# Patient Record
Sex: FEMALE | Race: WHITE | NOT HISPANIC OR LATINO | Employment: OTHER | URBAN - METROPOLITAN AREA
[De-identification: names, ages, dates, MRNs, and addresses within clinical notes are randomized per-mention and may not be internally consistent; named-entity substitution may affect disease eponyms.]

---

## 2017-02-06 ENCOUNTER — OFFICE VISIT (OUTPATIENT)
Dept: FAMILY MEDICINE CLINIC | Facility: CLINIC | Age: 62
End: 2017-02-06

## 2017-02-06 VITALS
OXYGEN SATURATION: 99 % | BODY MASS INDEX: 20.33 KG/M2 | WEIGHT: 122 LBS | HEIGHT: 65 IN | RESPIRATION RATE: 16 BRPM | SYSTOLIC BLOOD PRESSURE: 110 MMHG | HEART RATE: 62 BPM | DIASTOLIC BLOOD PRESSURE: 70 MMHG

## 2017-02-06 DIAGNOSIS — Z00.00 ROUTINE GENERAL MEDICAL EXAMINATION AT A HEALTH CARE FACILITY: ICD-10-CM

## 2017-02-06 DIAGNOSIS — Z12.39 SCREENING FOR BREAST CANCER: ICD-10-CM

## 2017-02-06 DIAGNOSIS — Z11.9 SCREENING EXAMINATION FOR INFECTIOUS DISEASE: ICD-10-CM

## 2017-02-06 DIAGNOSIS — M85.80 OSTEOPENIA: ICD-10-CM

## 2017-02-06 DIAGNOSIS — Z13.220 SCREENING FOR LIPOID DISORDERS: Primary | ICD-10-CM

## 2017-02-06 LAB
ALBUMIN SERPL-MCNC: 4.9 G/DL (ref 3.5–5.2)
ALBUMIN/GLOB SERPL: 2.3 G/DL
ALP SERPL-CCNC: 43 U/L (ref 39–117)
ALT SERPL-CCNC: 15 U/L (ref 1–33)
AST SERPL-CCNC: 21 U/L (ref 1–32)
BASOPHILS # BLD AUTO: 0.04 10*3/MM3 (ref 0–0.2)
BASOPHILS NFR BLD AUTO: 0.9 % (ref 0–1.5)
BILIRUB SERPL-MCNC: 0.6 MG/DL (ref 0.1–1.2)
BUN SERPL-MCNC: 19 MG/DL (ref 8–23)
BUN/CREAT SERPL: 26.4 (ref 7–25)
CALCIUM SERPL-MCNC: 9.5 MG/DL (ref 8.6–10.5)
CHLORIDE SERPL-SCNC: 100 MMOL/L (ref 98–107)
CHOLEST SERPL-MCNC: 194 MG/DL (ref 0–200)
CO2 SERPL-SCNC: 27 MMOL/L (ref 22–29)
CREAT SERPL-MCNC: 0.72 MG/DL (ref 0.57–1)
EOSINOPHIL # BLD AUTO: 0.08 10*3/MM3 (ref 0–0.7)
EOSINOPHIL NFR BLD AUTO: 1.9 % (ref 0.3–6.2)
ERYTHROCYTE [DISTWIDTH] IN BLOOD BY AUTOMATED COUNT: 13.5 % (ref 11.7–13)
GLOBULIN SER CALC-MCNC: 2.1 GM/DL
GLUCOSE SERPL-MCNC: 97 MG/DL (ref 65–99)
HCT VFR BLD AUTO: 45 % (ref 35.6–45.5)
HDLC SERPL-MCNC: 117 MG/DL (ref 40–60)
HGB BLD-MCNC: 14.1 G/DL (ref 11.9–15.5)
IMM GRANULOCYTES # BLD: 0 10*3/MM3 (ref 0–0.03)
IMM GRANULOCYTES NFR BLD: 0 % (ref 0–0.5)
LDLC SERPL CALC-MCNC: 68 MG/DL (ref 0–100)
LDLC/HDLC SERPL: 0.58 {RATIO}
LYMPHOCYTES # BLD AUTO: 1.27 10*3/MM3 (ref 0.9–4.8)
LYMPHOCYTES NFR BLD AUTO: 29.5 % (ref 19.6–45.3)
MCH RBC QN AUTO: 28.8 PG (ref 26.9–32)
MCHC RBC AUTO-ENTMCNC: 31.3 G/DL (ref 32.4–36.3)
MCV RBC AUTO: 91.8 FL (ref 80.5–98.2)
MONOCYTES # BLD AUTO: 0.55 10*3/MM3 (ref 0.2–1.2)
MONOCYTES NFR BLD AUTO: 12.8 % (ref 5–12)
NEUTROPHILS # BLD AUTO: 2.37 10*3/MM3 (ref 1.9–8.1)
NEUTROPHILS NFR BLD AUTO: 54.9 % (ref 42.7–76)
PLATELET # BLD AUTO: 232 10*3/MM3 (ref 140–500)
POTASSIUM SERPL-SCNC: 4.5 MMOL/L (ref 3.5–5.2)
PROT SERPL-MCNC: 7 G/DL (ref 6–8.5)
RBC # BLD AUTO: 4.9 10*6/MM3 (ref 3.9–5.2)
SODIUM SERPL-SCNC: 141 MMOL/L (ref 136–145)
TRIGL SERPL-MCNC: 43 MG/DL (ref 0–150)
TSH SERPL DL<=0.005 MIU/L-ACNC: 1.23 MIU/ML (ref 0.27–4.2)
VLDLC SERPL CALC-MCNC: 8.6 MG/DL (ref 5–40)
WBC # BLD AUTO: 4.31 10*3/MM3 (ref 4.5–10.7)

## 2017-02-06 PROCEDURE — 99396 PREV VISIT EST AGE 40-64: CPT | Performed by: NURSE PRACTITIONER

## 2017-02-06 NOTE — PROGRESS NOTES
"Preventive Exam    History of Present Illness: Jackie is here for check up and review of routine health maintenance. She states she is doing well and has no concerns    REVIEW OF SYSTEMS  Constitutional: Negative.    HENT: Negative.    Eyes: Negative.    Respiratory: Negative.    Cardiovascular: Negative.    Gastrointestinal: Negative.    Endocrine: Negative.    Genitourinary: Negative.    Musculoskeletal: Negative for neck stiffness.   Skin: Negative.    Allergic/Immunologic: Negative.    Neurological: Negative.    Hematological: Negative.    Psychiatric/Behavioral: Negative.    All other systems reviewed and are negative.          PHYSICAL EXAM    Vitals:    02/06/17 0815   BP: 110/70   Pulse: 62   Resp: 16   SpO2: 99%   Weight: 122 lb (55.3 kg)   Height: 65\" (165.1 cm)     GENERAL: alert and oriented, afebrile and vital signs stable  HEENT: oral mucosa moist, PEERLA, EOM, conjunctiva normal  No cervical adenopathy  LUNGS: clear to ascultation bilaterally, no rales, ronchi or wheezing  HEART: RRR S1 S2 without murmers, thrills, rubs or gallops  CHEST WALL: within normal limits, no tenderness  BREAST EXAM: No masses, skin changes, tenderness or discharge noted  ABDOMEN: WNL. Normal BS.  EXTREMITIES: No clubbing, cyanosis or edema noted. Normal Pulses.  SKIN: warm, dry, no rashes noted  NEURO: CN II- XII grossly intact    ASSESSMENT AND PLAN  Problem List Items Addressed This Visit     None      Visit Diagnoses     Screening for lipoid disorders    -  Primary    Relevant Orders    Lipid Panel With LDL / HDL Ratio    Routine general medical examination at a health care facility        Relevant Orders    Comprehensive Metabolic Panel    CBC & AUTO Differential    TSH    Screening examination for infectious disease        Relevant Orders    HCV Antibody Reflex To [facility]    Screening for breast cancer        Relevant Orders    Mammo Screening Bilateral With CAD    Osteopenia        Relevant Orders    DEXA Bone Density Axial "        Routine health maintenance reviewed and discussed with Jackie.    .  Orders Placed This Encounter   Procedures   • Mammo Screening Bilateral With CAD     Scheduling Instructions:      Pt scheduling herself at [facility]     Order Specific Question:   Reason for Exam:     Answer:   Yearly Screening   • DEXA Bone Density Axial     Scheduling Instructions:      Pt will schedule     Order Specific Question:   Reason for Exam:     Answer:   osteopenia   • Lipid Panel With LDL / HDL Ratio   • Comprehensive Metabolic Panel   • TSH   • HCV Antibody Reflex To [facility]OKO     Return for Annual.

## 2017-02-06 NOTE — PATIENT INSTRUCTIONS
Calcium; Vitamin D oral tablets  What is this medicine?  CALCIUM; VITAMIN D (GULSHAN see um; VYE ta min D) is a vitamin supplement. It is used to prevent conditions of low calcium and vitamin D.  This medicine may be used for other purposes; ask your health care provider or pharmacist if you have questions.  What should I tell my health care provider before I take this medicine?  They need to know if you have any of these conditions:  -constipation  -dehydration  -heart disease  -high level of calcium or vitamin D in the blood  -high level of phosphate in the blood  -kidney disease  -kidney stones  -liver disease  -parathyroid disease  -sarcoidosis  -stomach ulcer or obstruction  -an unusual or allergic reaction to calcium, vitamin D, tartrazine dye, other medicines, foods, dyes, or preservatives  -pregnant or trying to get pregnant  -breast-feeding  How should I use this medicine?  Take this medicine by mouth with a glass of water. Follow the directions on the label. Take with food or within 1 hour after a meal. Take your medicine at regular intervals. Do not take your medicine more often than directed.  Talk to your pediatrician regarding the use of this medicine in children. While this medicine may be used in children for selected conditions, precautions do apply.  Overdosage: If you think you have taken too much of this medicine contact a poison control center or emergency room at once.  NOTE: This medicine is only for you. Do not share this medicine with others.  What if I miss a dose?  If you miss a dose, take it as soon as you can. If it is almost time for your next dose, take only that dose. Do not take double or extra doses.  What may interact with this medicine?  Do not take this medicine with any of the following medications:  -ammonium chloride  -methenamine  This medicine may also interact with the following medications:  -antibiotics like ciprofloxacin, gatifloxacin,  tetracycline  -captopril  -delavirdine  -diuretics  -gabapentin  -iron supplements  -medicines for fungal infections like ketoconazole and itraconazole  -medicines for seizures like ethotoin and phenytoin  -mineral oil  -mycophenolate  -other vitamins with calcium, vitamin D, or minerals  -quinidine  -rosuvastatin  -sucralfate  -thyroid medicine  This list may not describe all possible interactions. Give your health care provider a list of all the medicines, herbs, non-prescription drugs, or dietary supplements you use. Also tell them if you smoke, drink alcohol, or use illegal drugs. Some items may interact with your medicine.  What should I watch for while using this medicine?  Taking this medicine is not a substitute for a well-balanced diet and exercise. Talk with your doctor or health care provider and follow a healthy lifestyle.  Do not take this medicine with high-fiber foods, large amounts of alcohol, or drinks containing caffeine. Do not take this medicine within 2 hours of any other medicines.  What side effects may I notice from receiving this medicine?  Side effects that you should report to your doctor or health care professional as soon as possible:  -allergic reactions like skin rash, itching or hives, swelling of the face, lips, or tongue  -confusion  -dry mouth  -high blood pressure  -increased hunger or thirst  -increased urination  -irregular heartbeat  -metallic taste  -muscle or bone pain  -pain when urinating  -seizure  -unusually weak or tired  -weight loss  Side effects that usually do not require medical attention (report to your doctor or health care professional if they continue or are bothersome):  -constipation  -diarrhea  -headache  -loss of appetite  -nausea, vomiting  -stomach upset  This list may not describe all possible side effects. Call your doctor for medical advice about side effects. You may report side effects to FDA at 7-167-FDA-5989.  Where should I keep my medicine?  Keep  out of the reach of children.  Store at room temperature between 15 and 30 degrees C (59 and 86 degrees F). Protect from light. Keep container tightly closed. Throw away any unused medicine after the expiration date.  NOTE: This sheet is a summary. It may not cover all possible information. If you have questions about this medicine, talk to your doctor, pharmacist, or health care provider.     © 2016, [employer]. (2009-04-01 17:56:23)

## 2017-02-07 LAB
HCV AB S/CO SERPL IA: <0.1 S/CO RATIO (ref 0–0.9)
HCV AB SERPL QL IA: NORMAL

## 2017-02-17 ENCOUNTER — TELEPHONE (OUTPATIENT)
Dept: FAMILY MEDICINE CLINIC | Facility: CLINIC | Age: 62
End: 2017-02-17

## 2017-02-17 NOTE — TELEPHONE ENCOUNTER
Please call with DEXA report. Normal bone density in her spine, osteopenia in both hips. Encouraged weight bearing exercise and repeat in 2 years,

## 2017-02-23 ENCOUNTER — TELEPHONE (OUTPATIENT)
Dept: FAMILY MEDICINE CLINIC | Facility: CLINIC | Age: 62
End: 2017-02-23

## 2018-02-08 DIAGNOSIS — Z12.31 VISIT FOR SCREENING MAMMOGRAM: Primary | ICD-10-CM

## 2019-02-21 ENCOUNTER — OFFICE VISIT (OUTPATIENT)
Dept: FAMILY MEDICINE CLINIC | Facility: CLINIC | Age: 64
End: 2019-02-21

## 2019-02-21 VITALS
DIASTOLIC BLOOD PRESSURE: 82 MMHG | HEIGHT: 65 IN | HEART RATE: 75 BPM | WEIGHT: 121 LBS | BODY MASS INDEX: 20.16 KG/M2 | SYSTOLIC BLOOD PRESSURE: 126 MMHG | OXYGEN SATURATION: 99 %

## 2019-02-21 DIAGNOSIS — Z12.39 BREAST CANCER SCREENING: Primary | ICD-10-CM

## 2019-02-21 DIAGNOSIS — Z13.6 SCREENING FOR ISCHEMIC HEART DISEASE: ICD-10-CM

## 2019-02-21 DIAGNOSIS — Z12.9 SCREENING FOR CANCER: ICD-10-CM

## 2019-02-21 DIAGNOSIS — Z12.31 ENCOUNTER FOR SCREENING MAMMOGRAM FOR MALIGNANT NEOPLASM OF BREAST: ICD-10-CM

## 2019-02-21 DIAGNOSIS — Z00.00 MEDICARE ANNUAL WELLNESS VISIT, SUBSEQUENT: Primary | ICD-10-CM

## 2019-02-21 PROCEDURE — G0439 PPPS, SUBSEQ VISIT: HCPCS | Performed by: NURSE PRACTITIONER

## 2019-02-21 RX ORDER — TURMERIC ROOT EXTRACT 500 MG
TABLET ORAL
COMMUNITY

## 2019-02-21 NOTE — PROGRESS NOTES
QUICK REFERENCE INFORMATION:  The ABCs of the Annual Wellness Visit    Initial Medicare Wellness Visit    HEALTH RISK ASSESSMENT    1955    Recent Hospitalizations:  No hospitalization(s) within the last year..        Current Medical Providers:  Patient Care Team:  Ericka Alonzo APRN as PCP - General (Family Medicine)  Pancho Jennings MD as Consulting Physician (Allergy and Immunology)        Smoking Status:  Social History     Tobacco Use   Smoking Status Never Smoker       Alcohol Consumption:  Social History     Substance and Sexual Activity   Alcohol Use Yes    Comment: 5-7/week       Depression Screen:   PHQ-2/PHQ-9 Depression Screening 2/21/2019   Little interest or pleasure in doing things 0   Feeling down, depressed, or hopeless 0   Total Score 0       Health Habits and Functional and Cognitive Screening:  Functional & Cognitive Status 2/21/2019   Do you have difficulty preparing food and eating? No   Do you have difficulty bathing yourself, getting dressed or grooming yourself? No   Do you have difficulty using the toilet? No   Do you have difficulty moving around from place to place? No   Do you have trouble with steps or getting out of a bed or a chair? No   In the past year have you fallen or experienced a near fall? No   Current Diet Well Balanced Diet   Dental Exam Up to date   Eye Exam Up to date   Exercise (times per week) 7 times per week   Current Exercise Activities Include Walking   Do you need help using the phone?  No   Are you deaf or do you have serious difficulty hearing?  No   Do you need help with transportation? No   Do you need help shopping? No   Do you need help preparing meals?  No   Do you need help with housework?  No   Do you need help with laundry? No   Do you need help taking your medications? No   Do you need help managing money? No   Do you ever drive or ride in a car without wearing a seat belt? No   Have you felt unusual stress, anger or loneliness in the last month? No    Who do you live with? Spouse   If you need help, do you have trouble finding someone available to you? No   Have you been bothered in the last four weeks by sexual problems? No   Do you have difficulty concentrating, remembering or making decisions? No           Does the patient have evidence of cognitive impairment? No    Asiprin use counseling: Start ASA 81 mg daily       Recent Lab Results:    Visual Acuity:  No exam data present    Age-appropriate Screening Schedule:  Refer to the list below for future screening recommendations based on patient's age, sex and/or medical conditions. Orders for these recommended tests are listed in the plan section. The patient has been provided with a written plan.    Health Maintenance   Topic Date Due   • ZOSTER VACCINE (2 of 2) 04/19/2013   • DXA SCAN  02/06/2019   • PNEUMOCOCCAL VACCINES (65+ LOW/MEDIUM RISK) (1 of 2 - PCV13) 02/01/2020 (Originally 1/1/2018)   • MAMMOGRAM  03/01/2019   • COLONOSCOPY  09/10/2023   • TDAP/TD VACCINES (2 - Td) 11/09/2025   • INFLUENZA VACCINE  Addressed        Subjective   History of Present Illness    Jackie Deleon is a 64 y.o. female who presents for an Annual Wellness Visit.    The following portions of the patient's history were reviewed and updated as appropriate: allergies, current medications, past family history, past medical history, past social history, past surgical history and problem list.    Outpatient Medications Prior to Visit   Medication Sig Dispense Refill   • aspirin 81 MG EC tablet Take 81 mg by mouth daily.     • Calcium-Vitamin D-Vitamin K (VIACTIV) 500-500-40 MG-UNT-MCG chewable tablet Chew.     • Evening Primrose Oil 1000 MG capsule Take  by mouth.     • glucosamine-chondroitin 500-400 MG capsule capsule Take  by mouth 3 (three) times a day with meals.     • Turmeric 500 MG tablet Take  by mouth. 1,000       No facility-administered medications prior to visit.        Patient Active Problem List   Diagnosis   •  "Osteoporosis       Advance Care Planning:  has an advance directive - a copy HAS NOT been provided    Identification of Risk Factors:  Risk factors include: increased fall risk.    Review of Systems    Compared to one year ago, the patient feels her physical health is better.  Compared to one year ago, the patient feels her mental health is better.    Objective     Physical Exam   Constitutional: She is oriented to person, place, and time. Vital signs are normal. She appears well-developed and well-nourished.   HENT:   Head: Normocephalic and atraumatic.   Right Ear: External ear normal.   Left Ear: External ear normal.   Nose: Nose normal.   Mouth/Throat: Oropharynx is clear and moist.   Eyes: Conjunctivae and EOM are normal. Pupils are equal, round, and reactive to light.   Neck: Normal range of motion. Neck supple.   Cardiovascular: Normal rate, regular rhythm, normal heart sounds and intact distal pulses.   Pulmonary/Chest: Effort normal and breath sounds normal.   Abdominal: Soft. Normal appearance and bowel sounds are normal.   Musculoskeletal: Normal range of motion.   Neurological: She is alert and oriented to person, place, and time. She has normal reflexes.   Skin: Skin is warm and dry.   Psychiatric: She has a normal mood and affect. Her behavior is normal. Judgment and thought content normal.   Nursing note and vitals reviewed.      Vitals:    02/21/19 0854   BP: 126/82   Pulse: 75   SpO2: 99%   Weight: 54.9 kg (121 lb)   Height: 165.1 cm (65\")   PainSc: 0-No pain       Patient's Body mass index is 20.14 kg/m². BMI is within normal parameters. No follow-up required..      Assessment/Plan   Patient Self-Management and Personalized Health Advice  The patient has been provided with information about: fall prevention and preventive services including:   · Nutrition counseling provided.    Visit Diagnoses:    ICD-10-CM ICD-9-CM   1. Medicare annual wellness visit, subsequent Z00.00 V70.0   2. Screening for " ischemic heart disease Z13.6 V81.0       Orders Placed This Encounter   Procedures   • Comprehensive metabolic panel   • Lipid Panel With / Chol / HDL Ratio   • CBC & Differential     Order Specific Question:   Manual Differential     Answer:   No       Outpatient Encounter Medications as of 2/21/2019   Medication Sig Dispense Refill   • aspirin 81 MG EC tablet Take 81 mg by mouth daily.     • Calcium-Vitamin D-Vitamin K (VIACTIV) 500-500-40 MG-UNT-MCG chewable tablet Chew.     • Evening Primrose Oil 1000 MG capsule Take  by mouth.     • glucosamine-chondroitin 500-400 MG capsule capsule Take  by mouth 3 (three) times a day with meals.     • Turmeric 500 MG tablet Take  by mouth. 1,000       No facility-administered encounter medications on file as of 2/21/2019.        Reviewed use of high risk medication in the elderly: yes  Reviewed for potential of harmful drug interactions in the elderly: yes    Follow Up:  Return for Annual.     An After Visit Summary and PPPS with all of these plans were given to the patient.

## 2019-02-22 LAB
ALBUMIN SERPL-MCNC: 4.8 G/DL (ref 3.6–4.8)
ALBUMIN/GLOB SERPL: 2.2 {RATIO} (ref 1.2–2.2)
ALP SERPL-CCNC: 41 IU/L (ref 39–117)
ALT SERPL-CCNC: 13 IU/L (ref 0–32)
AST SERPL-CCNC: 20 IU/L (ref 0–40)
BASOPHILS # BLD AUTO: 0 X10E3/UL (ref 0–0.2)
BASOPHILS NFR BLD AUTO: 1 %
BILIRUB SERPL-MCNC: 0.4 MG/DL (ref 0–1.2)
BUN SERPL-MCNC: 20 MG/DL (ref 8–27)
BUN/CREAT SERPL: 32 (ref 12–28)
CALCIUM SERPL-MCNC: 9.9 MG/DL (ref 8.7–10.3)
CHLORIDE SERPL-SCNC: 103 MMOL/L (ref 96–106)
CHOLEST SERPL-MCNC: 198 MG/DL (ref 100–199)
CHOLEST/HDLC SERPL: 1.9 RATIO (ref 0–4.4)
CO2 SERPL-SCNC: 25 MMOL/L (ref 20–29)
CREAT SERPL-MCNC: 0.62 MG/DL (ref 0.57–1)
EOSINOPHIL # BLD AUTO: 0.1 X10E3/UL (ref 0–0.4)
EOSINOPHIL NFR BLD AUTO: 2 %
ERYTHROCYTE [DISTWIDTH] IN BLOOD BY AUTOMATED COUNT: 13.4 % (ref 12.3–15.4)
GLOBULIN SER CALC-MCNC: 2.2 G/DL (ref 1.5–4.5)
GLUCOSE SERPL-MCNC: 90 MG/DL (ref 65–99)
HCT VFR BLD AUTO: 44.8 % (ref 34–46.6)
HDLC SERPL-MCNC: 105 MG/DL
HGB BLD-MCNC: 14.5 G/DL (ref 11.1–15.9)
IMM GRANULOCYTES # BLD AUTO: 0 X10E3/UL (ref 0–0.1)
IMM GRANULOCYTES NFR BLD AUTO: 0 %
LDLC SERPL CALC-MCNC: 86 MG/DL (ref 0–99)
LYMPHOCYTES # BLD AUTO: 1.3 X10E3/UL (ref 0.7–3.1)
LYMPHOCYTES NFR BLD AUTO: 28 %
MCH RBC QN AUTO: 28.6 PG (ref 26.6–33)
MCHC RBC AUTO-ENTMCNC: 32.4 G/DL (ref 31.5–35.7)
MCV RBC AUTO: 88 FL (ref 79–97)
MONOCYTES # BLD AUTO: 0.3 X10E3/UL (ref 0.1–0.9)
MONOCYTES NFR BLD AUTO: 7 %
NEUTROPHILS # BLD AUTO: 2.8 X10E3/UL (ref 1.4–7)
NEUTROPHILS NFR BLD AUTO: 62 %
PLATELET # BLD AUTO: 250 X10E3/UL (ref 150–379)
POTASSIUM SERPL-SCNC: 4.8 MMOL/L (ref 3.5–5.2)
PROT SERPL-MCNC: 7 G/DL (ref 6–8.5)
RBC # BLD AUTO: 5.07 X10E6/UL (ref 3.77–5.28)
SODIUM SERPL-SCNC: 143 MMOL/L (ref 134–144)
TRIGL SERPL-MCNC: 33 MG/DL (ref 0–149)
VLDLC SERPL CALC-MCNC: 7 MG/DL (ref 5–40)
WBC # BLD AUTO: 4.5 X10E3/UL (ref 3.4–10.8)

## 2020-03-10 ENCOUNTER — OFFICE VISIT (OUTPATIENT)
Dept: FAMILY MEDICINE CLINIC | Facility: CLINIC | Age: 65
End: 2020-03-10

## 2020-03-10 VITALS
HEIGHT: 66 IN | WEIGHT: 122 LBS | OXYGEN SATURATION: 99 % | TEMPERATURE: 97.5 F | RESPIRATION RATE: 16 BRPM | HEART RATE: 153 BPM | BODY MASS INDEX: 19.61 KG/M2 | DIASTOLIC BLOOD PRESSURE: 58 MMHG | SYSTOLIC BLOOD PRESSURE: 90 MMHG

## 2020-03-10 DIAGNOSIS — Z79.899 HIGH RISK MEDICATION USE: ICD-10-CM

## 2020-03-10 DIAGNOSIS — Z13.820 OSTEOPOROSIS SCREENING: ICD-10-CM

## 2020-03-10 DIAGNOSIS — Z13.220 ENCOUNTER FOR LIPID SCREENING FOR CARDIOVASCULAR DISEASE: ICD-10-CM

## 2020-03-10 DIAGNOSIS — Z12.39 ENCOUNTER FOR OTHER SCREENING FOR MALIGNANT NEOPLASM OF BREAST: Primary | ICD-10-CM

## 2020-03-10 DIAGNOSIS — Z78.0 POST-MENOPAUSAL: ICD-10-CM

## 2020-03-10 DIAGNOSIS — Z13.6 ENCOUNTER FOR LIPID SCREENING FOR CARDIOVASCULAR DISEASE: ICD-10-CM

## 2020-03-10 PROCEDURE — G0439 PPPS, SUBSEQ VISIT: HCPCS | Performed by: NURSE PRACTITIONER

## 2020-03-10 NOTE — PROGRESS NOTES
"Preventive Exam    History of Present Illness: Jackie is here for check up and review of routine health maintenance. She states she is doing well and has no concerns    REVIEW OF SYSTEMS  Constitutional: Negative.    HENT: Negative.    Eyes: Negative.    Respiratory: Negative.    Cardiovascular: Negative.    Gastrointestinal: Negative.    Endocrine: Negative.    Genitourinary: Negative.    Musculoskeletal: Negative.  Skin: Negative.    Allergic/Immunologic: Negative.    Neurological: Negative.    Hematological: Negative.    Psychiatric/Behavioral: Negative.    I have reviewed the ROS as documented by the MA. Sheila Cooper MA       PHYSICAL EXAM    Vitals:    03/10/20 1000   BP: 90/58   Pulse: (!) 153   Resp: 16   Temp: 97.5 °F (36.4 °C)   SpO2: 99%   Weight: 55.3 kg (122 lb)   Height: 167.6 cm (66\")     GENERAL: alert and oriented, afebrile and vital signs stable  HEENT: oral mucosa moist, PEERLA, EOM, conjunctiva normal  No cervical adenopathy  LUNGS: clear to ascultation bilaterally, no rales, ronchi or wheezing  HEART: RRR S1 S2 without murmers, thrills, rubs or gallops  CHEST WALL: within normal limits, no tenderness  BREAST EXAM: No masses, skin changes, tenderness or discharge noted  ABDOMEN: WNL. Normal BS.  EXTREMITIES: No clubbing, cyanosis or edema noted. Normal Pulses.  SKIN: warm, dry, no rashes noted  NEURO: CN II- XII grossly intact    ASSESSMENT AND PLAN  Problem List Items Addressed This Visit     None        Routine health maintenance reviewed and discussed with Jackie.      No follow-ups on file. Answers for HPI/ROS submitted by the patient on 3/7/2020   What is the primary reason for your visit?: Physical    "

## 2020-03-10 NOTE — PROGRESS NOTES
The ABCs of the Annual Wellness Visit  Subsequent Medicare Wellness Visit    No chief complaint on file.      Subjective   History of Present Illness:  Jackie Deleon is a 65 y.o. female who presents for a Subsequent Medicare Wellness Visit.    HEALTH RISK ASSESSMENT    Recent Hospitalizations:  No hospitalization(s) within the last year.    Current Medical Providers:  Patient Care Team:  Lesley Moncada APRN as PCP - General (Family Medicine)  Pancho Jennings MD as Consulting Physician (Allergy and Immunology)    Smoking Status:  Social History     Tobacco Use   Smoking Status Never Smoker       Alcohol Consumption:  Social History     Substance and Sexual Activity   Alcohol Use Yes    Comment: 5-7/week       Depression Screen:   PHQ-2/PHQ-9 Depression Screening 2/21/2019   Little interest or pleasure in doing things 0   Feeling down, depressed, or hopeless 0   Total Score 0       Fall Risk Screen:  STEADI Fall Risk Assessment has not been completed.    Health Habits and Functional and Cognitive Screening:  Functional & Cognitive Status 2/21/2019   Do you have difficulty preparing food and eating? No   Do you have difficulty bathing yourself, getting dressed or grooming yourself? No   Do you have difficulty using the toilet? No   Do you have difficulty moving around from place to place? No   Do you have trouble with steps or getting out of a bed or a chair? No   Current Diet Well Balanced Diet   Dental Exam Up to date   Eye Exam Up to date   Exercise (times per week) 7 times per week   Current Exercise Activities Include Walking   Do you need help using the phone?  No   Are you deaf or do you have serious difficulty hearing?  No   Do you need help with transportation? No   Do you need help shopping? No   Do you need help preparing meals?  No   Do you need help with housework?  No   Do you need help with laundry? No   Do you need help taking your medications? No   Do you need help managing money? No   Do you ever  drive or ride in a car without wearing a seat belt? No   Have you felt unusual stress, anger or loneliness in the last month? No   Who do you live with? Spouse   If you need help, do you have trouble finding someone available to you? No   Have you been bothered in the last four weeks by sexual problems? No   Do you have difficulty concentrating, remembering or making decisions? No         Does the patient have evidence of cognitive impairment? No    Asprin use counseling:Does not need ASA but is currently taking (advised patient that ASA is not indicated and patient chooses to stop it)    Age-appropriate Screening Schedule:  Refer to the list below for future screening recommendations based on patient's age, sex and/or medical conditions. Orders for these recommended tests are listed in the plan section. The patient has been provided with a written plan.    Health Maintenance   Topic Date Due   • DXA SCAN  02/06/2019   • MAMMOGRAM  03/01/2019   • INFLUENZA VACCINE  08/01/2019   • COLONOSCOPY  09/10/2023   • TDAP/TD VACCINES (2 - Td) 11/09/2025   • ZOSTER VACCINE  Completed          The following portions of the patient's history were reviewed and updated as appropriate: allergies, current medications, past family history, past medical history, past social history, past surgical history and problem list.    Outpatient Medications Prior to Visit   Medication Sig Dispense Refill   • aspirin 81 MG EC tablet Take 81 mg by mouth daily.     • Calcium-Vitamin D-Vitamin K (VIACTIV) 500-500-40 MG-UNT-MCG chewable tablet Chew.     • Evening Primrose Oil 1000 MG capsule Take  by mouth.     • glucosamine-chondroitin 500-400 MG capsule capsule Take  by mouth 3 (three) times a day with meals.     • Turmeric 500 MG tablet Take  by mouth. 1,000       No facility-administered medications prior to visit.        Patient Active Problem List   Diagnosis   • Osteoporosis       Advanced Care Planning:  ACP discussion was held with the  "patient during this visit.    Review of Systems   Constitutional: Negative for activity change, appetite change and fever.   HENT: Negative for ear pain.    Respiratory: Negative for cough.    Neurological: Negative for dizziness and headaches.       Compared to one year ago, the patient feels her physical health is the same.  Compared to one year ago, the patient feels her mental health is the same.    Reviewed chart for potential of high risk medication in the elderly: not applicable  Reviewed chart for potential of harmful drug interactions in the elderly:yes    Objective         Vitals:    03/10/20 1000   BP: 90/58   Pulse: (!) 153   Resp: 16   Temp: 97.5 °F (36.4 °C)   SpO2: 99%   Weight: 55.3 kg (122 lb)   Height: 167.6 cm (66\")       Body mass index is 19.69 kg/m².  Discussed the patient's BMI with her. The BMI is in the acceptable range.    Physical Exam   Constitutional: She appears well-developed and well-nourished. No distress.   HENT:   Head: Normocephalic and atraumatic.   Right Ear: External ear normal.   Left Ear: External ear normal.   Eyes: EOM are normal.   Neck: Neck supple. No thyromegaly present.   Cardiovascular: Normal rate, regular rhythm and normal heart sounds.   Pulmonary/Chest: Effort normal and breath sounds normal.   Musculoskeletal: Normal range of motion.   Neurological: She is alert.   Skin: Skin is warm.   Nursing note and vitals reviewed.            Assessment/Plan   Medicare Risks and Personalized Health Plan  CMS Preventative Services Quick Reference  Breast Cancer/Mammogram Screening    The above risks/problems have been discussed with the patient.  Pertinent information has been shared with the patient in the After Visit Summary.  Follow up plans and orders are seen below in the Assessment/Plan Section.    There are no diagnoses linked to this encounter.  Follow Up:  No follow-ups on file.     An After Visit Summary and PPPS were given to the patient.       Preventative " counseling for diet and exercise with patient at visit.  Pt reports that they wear their seatbelt regularly.         Answers for HPI/ROS submitted by the patient on 3/7/2020   What is the primary reason for your visit?: Physical

## 2020-03-11 LAB
ALBUMIN SERPL-MCNC: 4.6 G/DL (ref 3.5–5.2)
ALBUMIN/GLOB SERPL: 1.9 G/DL
ALP SERPL-CCNC: 45 U/L (ref 39–117)
ALT SERPL-CCNC: 14 U/L (ref 1–33)
AST SERPL-CCNC: 19 U/L (ref 1–32)
BILIRUB SERPL-MCNC: 0.7 MG/DL (ref 0.2–1.2)
BUN SERPL-MCNC: 13 MG/DL (ref 8–23)
BUN/CREAT SERPL: 18.1 (ref 7–25)
CALCIUM SERPL-MCNC: 9.5 MG/DL (ref 8.6–10.5)
CHLORIDE SERPL-SCNC: 100 MMOL/L (ref 98–107)
CHOLEST SERPL-MCNC: 172 MG/DL (ref 0–200)
CO2 SERPL-SCNC: 26.9 MMOL/L (ref 22–29)
CREAT SERPL-MCNC: 0.72 MG/DL (ref 0.57–1)
GLOBULIN SER CALC-MCNC: 2.4 GM/DL
GLUCOSE SERPL-MCNC: 87 MG/DL (ref 65–99)
HDLC SERPL-MCNC: 89 MG/DL (ref 40–60)
LDLC SERPL CALC-MCNC: 74 MG/DL (ref 0–100)
LDLC/HDLC SERPL: 0.84 {RATIO}
POTASSIUM SERPL-SCNC: 4.5 MMOL/L (ref 3.5–5.2)
PROT SERPL-MCNC: 7 G/DL (ref 6–8.5)
SODIUM SERPL-SCNC: 139 MMOL/L (ref 136–145)
TRIGL SERPL-MCNC: 43 MG/DL (ref 0–150)
VLDLC SERPL CALC-MCNC: 8.6 MG/DL

## 2020-03-13 ENCOUNTER — TELEPHONE (OUTPATIENT)
Dept: FAMILY MEDICINE CLINIC | Facility: CLINIC | Age: 65
End: 2020-03-13

## 2020-03-13 NOTE — TELEPHONE ENCOUNTER
Juana with central scheduling received an order for the patient's Bone Density Scan, she would like to know the last time the patient had a Bone Density Scan      Please call back to advise [phone]

## 2020-04-07 ENCOUNTER — TELEMEDICINE (OUTPATIENT)
Dept: FAMILY MEDICINE CLINIC | Facility: CLINIC | Age: 65
End: 2020-04-07

## 2020-04-07 DIAGNOSIS — F41.9 ANXIETY: Primary | ICD-10-CM

## 2020-04-07 PROCEDURE — 99213 OFFICE O/P EST LOW 20 MIN: CPT | Performed by: NURSE PRACTITIONER

## 2020-04-07 RX ORDER — SERTRALINE HYDROCHLORIDE 25 MG/1
25 TABLET, FILM COATED ORAL DAILY
Qty: 60 TABLET | Refills: 1 | Status: SHIPPED | OUTPATIENT
Start: 2020-04-07 | End: 2022-04-01

## 2020-04-08 NOTE — PATIENT INSTRUCTIONS
Generalized Anxiety Disorder, Adult  Generalized anxiety disorder (ALEJANDRO) is a mental health disorder. People with this condition constantly worry about everyday events. Unlike normal anxiety, worry related to ALEJANDRO is not triggered by a specific event. These worries also do not fade or get better with time. ALEJANDRO interferes with life functions, including relationships, work, and school.  ALEJANDRO can vary from mild to severe. People with severe ALEJANDRO can have intense waves of anxiety with physical symptoms (panic attacks).  What are the causes?  The exact cause of ALEJANDRO is not known.  What increases the risk?  This condition is more likely to develop in:  · Women.  · People who have a family history of anxiety disorders.  · People who are very shy.  · People who experience very stressful life events, such as the death of a loved one.  · People who have a very stressful family environment.  What are the signs or symptoms?  People with ALEJANDRO often worry excessively about many things in their lives, such as their health and family. They may also be overly concerned about:  · Doing well at work.  · Being on time.  · Natural disasters.  · Friendships.  Physical symptoms of ALEJANDRO include:  · Fatigue.  · Muscle tension or having muscle twitches.  · Trembling or feeling shaky.  · Being easily startled.  · Feeling like your heart is pounding or racing.  · Feeling out of breath or like you cannot take a deep breath.  · Having trouble falling asleep or staying asleep.  · Sweating.  · Nausea, diarrhea, or irritable bowel syndrome (IBS).  · Headaches.  · Trouble concentrating or remembering facts.  · Restlessness.  · Irritability.  How is this diagnosed?  Your health care provider can diagnose ALEJANDRO based on your symptoms and medical history. You will also have a physical exam. The health care provider will ask specific questions about your symptoms, including how severe they are, when they started, and if they come and go. Your health care  provider may ask you about your use of alcohol or drugs, including prescription medicines. Your health care provider may refer you to a mental health specialist for further evaluation.  Your health care provider will do a thorough examination and may perform additional tests to rule out other possible causes of your symptoms.  To be diagnosed with ALEJANDRO, a person must have anxiety that:  · Is out of his or her control.  · Affects several different aspects of his or her life, such as work and relationships.  · Causes distress that makes him or her unable to take part in normal activities.  · Includes at least three physical symptoms of ALEJANDRO, such as restlessness, fatigue, trouble concentrating, irritability, muscle tension, or sleep problems.  Before your health care provider can confirm a diagnosis of ALEJANDRO, these symptoms must be present more days than they are not, and they must last for six months or longer.  How is this treated?  The following therapies are usually used to treat ALEJANDRO:  · Medicine. Antidepressant medicine is usually prescribed for long-term daily control. Antianxiety medicines may be added in severe cases, especially when panic attacks occur.  · Talk therapy (psychotherapy). Certain types of talk therapy can be helpful in treating ALEJANDRO by providing support, education, and guidance. Options include:  ? Cognitive behavioral therapy (CBT). People learn coping skills and techniques to ease their anxiety. They learn to identify unrealistic or negative thoughts and behaviors and to replace them with positive ones.  ? Acceptance and commitment therapy (ACT). This treatment teaches people how to be mindful as a way to cope with unwanted thoughts and feelings.  ? Biofeedback. This process trains you to manage your body's response (physiological response) through breathing techniques and relaxation methods. You will work with a therapist while machines are used to monitor your physical symptoms.  · Stress  management techniques. These include yoga, meditation, and exercise.  A mental health specialist can help determine which treatment is best for you. Some people see improvement with one type of therapy. However, other people require a combination of therapies.  Follow these instructions at home:  · Take over-the-counter and prescription medicines only as told by your health care provider.  · Try to maintain a normal routine.  · Try to anticipate stressful situations and allow extra time to manage them.  · Practice any stress management or self-calming techniques as taught by your health care provider.  · Do not punish yourself for setbacks or for not making progress.  · Try to recognize your accomplishments, even if they are small.  · Keep all follow-up visits as told by your health care provider. This is important.  Contact a health care provider if:  · Your symptoms do not get better.  · Your symptoms get worse.  · You have signs of depression, such as:  ? A persistently sad, cranky, or irritable mood.  ? Loss of enjoyment in activities that used to bring you sabine.  ? Change in weight or eating.  ? Changes in sleeping habits.  ? Avoiding friends or family members.  ? Loss of energy for normal tasks.  ? Feelings of guilt or worthlessness.  Get help right away if:  · You have serious thoughts about hurting yourself or others.  If you ever feel like you may hurt yourself or others, or have thoughts about taking your own life, get help right away. You can go to your nearest emergency department or call:  · Your local emergency services (911 in the [place]  · A suicide crisis helpline, such as the National Suicide Prevention Lifeline at 1-227.696.5853. This is open 24 hours a day.  Summary  · Generalized anxiety disorder (ALEJANDRO) is a mental health disorder that involves worry that is not triggered by a specific event.  · People with ALEJANDRO often worry excessively about many things in their lives, such as their health and  family.  · ALEJANDRO may cause physical symptoms such as restlessness, trouble concentrating, sleep problems, frequent sweating, nausea, diarrhea, headaches, and trembling or muscle twitching.  · A mental health specialist can help determine which treatment is best for you. Some people see improvement with one type of therapy. However, other people require a combination of therapies.  This information is not intended to replace advice given to you by your health care provider. Make sure you discuss any questions you have with your health care provider.  Document Released: 04/14/2014 Document Revised: 01/07/2020 Document Reviewed: 11/07/2017  [employer] Patient Education © 2020 [employer]

## 2020-04-08 NOTE — PROGRESS NOTES
Subjective anxiety  Jackie Deleon is a 65 y.o. female.     History of Present Illness   Pt is very healthy and not on any prescription medication. She is an avid exerciser but since the Covid 19 restrictions have been put into place she is having some notable anxiety. It is affecting her sleep as well. No SI or HI  The following portions of the patient's history were reviewed and updated as appropriate: allergies, current medications, past family history, past medical history, past social history, past surgical history and problem list.    Review of Systems   Constitutional: Positive for activity change. Negative for fatigue and unexpected weight gain.   Respiratory: Negative for cough and shortness of breath.    Neurological: Negative for dizziness, light-headedness and confusion.   Psychiatric/Behavioral: The patient is nervous/anxious.        Objective   Physical Exam   Constitutional: She is oriented to person, place, and time. She appears well-developed and well-nourished.   HENT:   Head: Normocephalic and atraumatic.   Eyes: Pupils are equal, round, and reactive to light. EOM are normal.   Pulmonary/Chest: Effort normal.   Musculoskeletal: Normal range of motion.   Neurological: She is alert and oriented to person, place, and time.   Skin: Skin is warm and dry.   Psychiatric: She has a normal mood and affect.   Appears anxious as she talks and is tearful at time during exam.   Nursing note and vitals reviewed.        Assessment/Plan   Jackie was seen today for anxiety.    Diagnoses and all orders for this visit:    Anxiety    Other orders  -     sertraline (Zoloft) 25 MG tablet; Take 1 tablet by mouth Daily.      Will check back with the office in a month for follow up.  Covid 19 medical advise given to stay healthy.

## 2020-11-05 DIAGNOSIS — Z78.0 POST-MENOPAUSAL: ICD-10-CM

## 2021-02-05 ENCOUNTER — IMMUNIZATION (OUTPATIENT)
Dept: VACCINE CLINIC | Facility: HOSPITAL | Age: 66
End: 2021-02-05

## 2021-02-05 PROCEDURE — 0001A: CPT | Performed by: INTERNAL MEDICINE

## 2021-02-05 PROCEDURE — 91300 HC SARSCOV02 VAC 30MCG/0.3ML IM: CPT | Performed by: INTERNAL MEDICINE

## 2021-02-26 ENCOUNTER — IMMUNIZATION (OUTPATIENT)
Dept: VACCINE CLINIC | Facility: HOSPITAL | Age: 66
End: 2021-02-26

## 2021-02-26 ENCOUNTER — APPOINTMENT (OUTPATIENT)
Dept: VACCINE CLINIC | Facility: HOSPITAL | Age: 66
End: 2021-02-26

## 2021-02-26 PROCEDURE — 0002A: CPT | Performed by: INTERNAL MEDICINE

## 2021-02-26 PROCEDURE — 91300 HC SARSCOV02 VAC 30MCG/0.3ML IM: CPT | Performed by: INTERNAL MEDICINE

## 2021-06-22 ENCOUNTER — OFFICE VISIT (OUTPATIENT)
Dept: FAMILY MEDICINE CLINIC | Facility: CLINIC | Age: 66
End: 2021-06-22

## 2021-06-22 VITALS
DIASTOLIC BLOOD PRESSURE: 78 MMHG | WEIGHT: 121.5 LBS | HEIGHT: 66 IN | BODY MASS INDEX: 19.53 KG/M2 | SYSTOLIC BLOOD PRESSURE: 120 MMHG | RESPIRATION RATE: 14 BRPM | HEART RATE: 69 BPM | OXYGEN SATURATION: 98 %

## 2021-06-22 DIAGNOSIS — D64.9 LOW HEMOGLOBIN: ICD-10-CM

## 2021-06-22 DIAGNOSIS — Z13.6 ENCOUNTER FOR LIPID SCREENING FOR CARDIOVASCULAR DISEASE: ICD-10-CM

## 2021-06-22 DIAGNOSIS — Z13.220 ENCOUNTER FOR LIPID SCREENING FOR CARDIOVASCULAR DISEASE: ICD-10-CM

## 2021-06-22 DIAGNOSIS — Z12.31 ENCOUNTER FOR SCREENING MAMMOGRAM FOR MALIGNANT NEOPLASM OF BREAST: ICD-10-CM

## 2021-06-22 DIAGNOSIS — Z00.00 MEDICARE ANNUAL WELLNESS VISIT, SUBSEQUENT: Primary | ICD-10-CM

## 2021-06-22 PROCEDURE — G0439 PPPS, SUBSEQ VISIT: HCPCS | Performed by: NURSE PRACTITIONER

## 2021-06-22 PROCEDURE — 1160F RVW MEDS BY RX/DR IN RCRD: CPT | Performed by: NURSE PRACTITIONER

## 2021-06-22 PROCEDURE — 1170F FXNL STATUS ASSESSED: CPT | Performed by: NURSE PRACTITIONER

## 2021-06-22 RX ORDER — AMOXICILLIN 250 MG
CAPSULE ORAL
COMMUNITY

## 2021-06-22 NOTE — PROGRESS NOTES
Medicare Subsequent Wellness Visit  Subjective   History of Present Illness    Jackie Deleon is a 66 y.o. female who presents for an Medicare Wellness Visit. In addition, we addressed the following health issues:  Was placed on zoloft for some anxiety but only took it as needed which did not help.She is doing better has no Hi or SI.    PMH, PSH, SocHx, FamHx, Allergies, and Medications: Reviewed and updated in the history section of chart.  Family History   Problem Relation Age of Onset   • Hypertension Mother    • Multiple myeloma Mother    • Hypertension Father    • Prostate cancer Father    • Breast cancer Paternal Aunt    • Heart disease Maternal Grandfather    • Stroke Maternal Grandfather        Social History     Social History Narrative   • Not on file       No Known Allergies    Outpatient Medications Prior to Visit   Medication Sig Dispense Refill   • aspirin 81 MG EC tablet Take 81 mg by mouth daily.     • Bioflavonoid Products (Sera-C) 500-550 MG tablet      • Calcium-Vitamin D-Vitamin K (VIACTIV) 500-500-40 MG-UNT-MCG chewable tablet Chew.     • Cobalamin Combinations (B-12) 100-5000 MCG sublingual tablet      • glucosamine-chondroitin 500-400 MG capsule capsule Take  by mouth 3 (three) times a day with meals.     • Turmeric 500 MG tablet Take  by mouth. 1,000     • sertraline (Zoloft) 25 MG tablet Take 1 tablet by mouth Daily. 60 tablet 1   • Evening Primrose Oil 1000 MG capsule Take  by mouth.       No facility-administered medications prior to visit.        Patient Active Problem List   Diagnosis   • Osteoporosis         Patient Care Team:  Lesley Moncada APRN as PCP - General (Family Medicine)  Pancho Jennings MD as Consulting Physician (Allergy and Immunology)  Health Habits:  Current Diet: Well balanced diet  Tobacco use.  Pack years:  Dental Exam.   Eye Exam.   Exercise:  Current exercise activities include:    Recent Hospitalizations:  none    Age-appropriate Screening Schedule:  Refer to  the list below for future screening recommendations based on patient's age. Orders for these recommended tests are listed in the plan section. The patient has been provided with a written plan.      Health Maintenance   Topic Date Due   • PAP SMEAR  11/09/2018   • MAMMOGRAM  03/24/2021   • Pneumococcal Vaccine 65+ (1 of 1 - PPSV23) 06/22/2022 (Originally 1/1/2018)   • INFLUENZA VACCINE  08/01/2021   • DXA SCAN  03/24/2022   • ANNUAL WELLNESS VISIT  06/22/2022   • COLORECTAL CANCER SCREENING  09/10/2023   • TDAP/TD VACCINES (2 - Td or Tdap) 11/09/2025   • HEPATITIS C SCREENING  Completed   • COVID-19 Vaccine  Completed   • ZOSTER VACCINE  Completed       Depression Screen:   PHQ-2/PHQ-9 Depression Screening 6/22/2021   Little interest or pleasure in doing things 0   Feeling down, depressed, or hopeless 0   Total Score 0       Functional and Cognitive Screening:  Functional & Cognitive Status 6/22/2021   Do you have difficulty preparing food and eating? No   Do you have difficulty bathing yourself, getting dressed or grooming yourself? No   Do you have difficulty using the toilet? No   Do you have difficulty moving around from place to place? No   Do you have trouble with steps or getting out of a bed or a chair? No   Current Diet Well Balanced Diet   Dental Exam Up to date   Eye Exam Up to date   Exercise (times per week) 6 times per week   Current Exercises Include Walking   Current Exercise Activities Include -   Do you need help using the phone?  No   Are you deaf or do you have serious difficulty hearing?  No   Do you need help with transportation? No   Do you need help shopping? No   Do you need help preparing meals?  No   Do you need help with housework?  No   Do you need help with laundry? No   Do you need help taking your medications? No   Do you need help managing money? No   Do you ever drive or ride in a car without wearing a seat belt? No   Have you felt unusual stress, anger or loneliness in the last  "month? Yes   Who do you live with? Spouse   If you need help, do you have trouble finding someone available to you? No   Have you been bothered in the last four weeks by sexual problems? No   Do you have difficulty concentrating, remembering or making decisions? No     Does the patient have evidence of cognitive impairment?   No  Compared to one year ago, the patient feels their physical health and mental health are the same.   Mental health is better.      Review of Systems    Objective     Vitals:    06/22/21 0801   BP: 120/78   Pulse: 69   Resp: 14   SpO2: 98%   Weight: 55.1 kg (121 lb 8 oz)   Height: 167.6 cm (66\")       Body mass index is 19.61 kg/m².    PHYSICAL EXAM  Vitals reviewed and on chart.  HEENT: PERRLA, EOMI. Oral mucosa moist,   No LAD.  CV: RRR, no murmurs, rubs, clicks or gallops  LUNGS: CTA bilaterally  EXT: No edema, FROM in bilateral upper and lower ext  NEURO: CN II - XII grossly intact  PSYCH: good mood, positive affect, alert and engaged. No thoughts of self harm  expressed.    ASSESSMENT AND PLAN  Pap smear :Not needed  Mammogram:Needs a mammogram  Colon cancer screening :Is due next year  Lung cancer screening :Never smoked  Bone Density : 2 years ago has osteopenia      Problem List Items Addressed This Visit     None          Orders:  No orders of the defined types were placed in this encounter.      Follow Up:  No follow-ups on file.     ADVANCED DIRECTIVES:   ACP discussion was held with the patient during this visit. Patient has an advance directive in EMR which is still valid.     An After Visit Summary and PPPS with all of these plans were given to the patient.    Preventative counseling for diet and exercise with patient at visit.  Pt reports that they wear their seatbelt regularly.            "

## 2021-06-23 LAB
ALBUMIN SERPL-MCNC: 4.7 G/DL (ref 3.5–5.2)
ALBUMIN/GLOB SERPL: 2.6 G/DL
ALP SERPL-CCNC: 45 U/L (ref 39–117)
ALT SERPL-CCNC: 13 U/L (ref 1–33)
AST SERPL-CCNC: 19 U/L (ref 1–32)
BILIRUB SERPL-MCNC: 0.4 MG/DL (ref 0–1.2)
BUN SERPL-MCNC: 15 MG/DL (ref 8–23)
BUN/CREAT SERPL: 25.9 (ref 7–25)
CALCIUM SERPL-MCNC: 9.5 MG/DL (ref 8.6–10.5)
CHLORIDE SERPL-SCNC: 106 MMOL/L (ref 98–107)
CHOLEST SERPL-MCNC: 166 MG/DL (ref 0–200)
CO2 SERPL-SCNC: 26.6 MMOL/L (ref 22–29)
CREAT SERPL-MCNC: 0.58 MG/DL (ref 0.57–1)
ERYTHROCYTE [DISTWIDTH] IN BLOOD BY AUTOMATED COUNT: 15 % (ref 12.3–15.4)
GLOBULIN SER CALC-MCNC: 1.8 GM/DL
GLUCOSE SERPL-MCNC: 90 MG/DL (ref 65–99)
HCT VFR BLD AUTO: 33.2 % (ref 34–46.6)
HDLC SERPL-MCNC: 104 MG/DL (ref 40–60)
HGB BLD-MCNC: 10.1 G/DL (ref 12–15.9)
LDLC SERPL CALC-MCNC: 55 MG/DL (ref 0–100)
LDLC/HDLC SERPL: 0.55 {RATIO}
MCH RBC QN AUTO: 23.5 PG (ref 26.6–33)
MCHC RBC AUTO-ENTMCNC: 30.4 G/DL (ref 31.5–35.7)
MCV RBC AUTO: 77.4 FL (ref 79–97)
PLATELET # BLD AUTO: 264 10*3/MM3 (ref 140–450)
POTASSIUM SERPL-SCNC: 4.5 MMOL/L (ref 3.5–5.2)
PROT SERPL-MCNC: 6.5 G/DL (ref 6–8.5)
RBC # BLD AUTO: 4.29 10*6/MM3 (ref 3.77–5.28)
SODIUM SERPL-SCNC: 140 MMOL/L (ref 136–145)
TRIGL SERPL-MCNC: 25 MG/DL (ref 0–150)
VLDLC SERPL CALC-MCNC: 7 MG/DL (ref 5–40)
WBC # BLD AUTO: 3.5 10*3/MM3 (ref 3.4–10.8)

## 2021-06-24 DIAGNOSIS — E61.1 LOW IRON: Primary | ICD-10-CM

## 2021-07-16 DIAGNOSIS — D64.9 LOW HEMOGLOBIN: ICD-10-CM

## 2021-07-16 DIAGNOSIS — E61.1 LOW IRON: Primary | ICD-10-CM

## 2021-08-11 RX ORDER — LANOLIN ALCOHOL/MO/W.PET/CERES
325 CREAM (GRAM) TOPICAL
Qty: 90 TABLET | Refills: 0 | Status: SHIPPED | OUTPATIENT
Start: 2021-08-11 | End: 2021-11-03

## 2021-11-03 RX ORDER — LANOLIN ALCOHOL/MO/W.PET/CERES
CREAM (GRAM) TOPICAL
Qty: 90 TABLET | Refills: 0 | Status: SHIPPED | OUTPATIENT
Start: 2021-11-03 | End: 2022-04-01

## 2022-03-16 ENCOUNTER — OFFICE VISIT (OUTPATIENT)
Dept: CARDIOLOGY | Facility: CLINIC | Age: 67
End: 2022-03-16

## 2022-03-16 VITALS
HEIGHT: 66 IN | BODY MASS INDEX: 19.89 KG/M2 | OXYGEN SATURATION: 99 % | SYSTOLIC BLOOD PRESSURE: 132 MMHG | WEIGHT: 123.8 LBS | HEART RATE: 48 BPM | DIASTOLIC BLOOD PRESSURE: 90 MMHG

## 2022-03-16 DIAGNOSIS — I48.19 ATRIAL FIBRILLATION, PERSISTENT: ICD-10-CM

## 2022-03-16 DIAGNOSIS — R00.2 PALPITATIONS: Primary | ICD-10-CM

## 2022-03-16 PROCEDURE — 93000 ELECTROCARDIOGRAM COMPLETE: CPT | Performed by: INTERNAL MEDICINE

## 2022-03-16 PROCEDURE — 99204 OFFICE O/P NEW MOD 45 MIN: CPT | Performed by: INTERNAL MEDICINE
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
## 2022-03-24 ENCOUNTER — LAB (OUTPATIENT)
Dept: LAB | Facility: HOSPITAL | Age: 67
End: 2022-03-24

## 2022-03-24 DIAGNOSIS — R00.2 PALPITATIONS: ICD-10-CM

## 2022-03-24 LAB
T4 FREE SERPL-MCNC: 1.02 NG/DL (ref 0.93–1.7)
TSH SERPL DL<=0.05 MIU/L-ACNC: 1.69 UIU/ML (ref 0.27–4.2)

## 2022-03-24 PROCEDURE — 36415 COLL VENOUS BLD VENIPUNCTURE: CPT

## 2022-03-24 PROCEDURE — 84443 ASSAY THYROID STIM HORMONE: CPT

## 2022-03-24 PROCEDURE — 84439 ASSAY OF FREE THYROXINE: CPT

## 2022-03-24 RX ORDER — METOPROLOL SUCCINATE 25 MG/1
25 TABLET, EXTENDED RELEASE ORAL DAILY
Qty: 30 TABLET | Refills: 0 | Status: SHIPPED | OUTPATIENT
Start: 2022-03-24 | End: 2022-04-01 | Stop reason: ALTCHOICE

## 2022-03-24 NOTE — PROGRESS NOTES
Dr. Samuel not in the office today.  Please let patient know that thyroid levels look normal.  Will await monitor results.  Would proceed with upcoming echo as scheduled as well.

## 2022-04-01 ENCOUNTER — HOSPITAL ENCOUNTER (OUTPATIENT)
Dept: CARDIOLOGY | Facility: HOSPITAL | Age: 67
Discharge: HOME | End: 2022-04-01
Admitting: INTERNAL MEDICINE

## 2022-04-01 ENCOUNTER — TELEPHONE (OUTPATIENT)
Dept: CARDIOLOGY | Facility: CLINIC | Age: 67
End: 2022-04-01

## 2022-04-01 ENCOUNTER — CLINICAL SUPPORT (OUTPATIENT)
Dept: CARDIOLOGY | Facility: CLINIC | Age: 67
End: 2022-04-01

## 2022-04-01 ENCOUNTER — TRANSCRIBE ORDERS (OUTPATIENT)
Dept: CARDIOLOGY | Facility: CLINIC | Age: 67
End: 2022-04-01

## 2022-04-01 VITALS
HEIGHT: 66 IN | SYSTOLIC BLOOD PRESSURE: 110 MMHG | BODY MASS INDEX: 19.77 KG/M2 | WEIGHT: 123 LBS | DIASTOLIC BLOOD PRESSURE: 66 MMHG

## 2022-04-01 VITALS — HEART RATE: 97 BPM

## 2022-04-01 DIAGNOSIS — R00.2 PALPITATIONS: ICD-10-CM

## 2022-04-01 DIAGNOSIS — I48.19 ATRIAL FIBRILLATION, PERSISTENT: Primary | ICD-10-CM

## 2022-04-01 DIAGNOSIS — Z01.810 PREPROCEDURAL CARDIOVASCULAR EXAMINATION: ICD-10-CM

## 2022-04-01 DIAGNOSIS — Z01.818 OTHER SPECIFIED PRE-OPERATIVE EXAMINATION: ICD-10-CM

## 2022-04-01 DIAGNOSIS — Z13.6 SCREENING FOR CARDIOVASCULAR CONDITION: Primary | ICD-10-CM

## 2022-04-01 LAB
AORTIC ARCH: 2.6 CM
ASCENDING AORTA: 3.8 CM
BH CV ECHO MEAS - ACS: 1.92 CM
BH CV ECHO MEAS - AO MAX PG: 4.3 MMHG
BH CV ECHO MEAS - AO MEAN PG: 2.3 MMHG
BH CV ECHO MEAS - AO ROOT DIAM: 3.2 CM
BH CV ECHO MEAS - AO V2 MAX: 103.2 CM/SEC
BH CV ECHO MEAS - AO V2 VTI: 17.9 CM
BH CV ECHO MEAS - AVA(I,D): 2.25 CM2
BH CV ECHO MEAS - EDV(CUBED): 91.1 ML
BH CV ECHO MEAS - EDV(MOD-SP2): 97 ML
BH CV ECHO MEAS - EDV(MOD-SP4): 103 ML
BH CV ECHO MEAS - EF(MOD-BP): 51.5 %
BH CV ECHO MEAS - EF(MOD-SP2): 49.5 %
BH CV ECHO MEAS - EF(MOD-SP4): 54.4 %
BH CV ECHO MEAS - ESV(CUBED): 28.1 ML
BH CV ECHO MEAS - ESV(MOD-SP2): 49 ML
BH CV ECHO MEAS - ESV(MOD-SP4): 47 ML
BH CV ECHO MEAS - FS: 32.5 %
BH CV ECHO MEAS - IVS/LVPW: 0.9 CM
BH CV ECHO MEAS - IVSD: 0.83 CM
BH CV ECHO MEAS - LV DIASTOLIC VOL/BSA (35-75): 63.3 CM2
BH CV ECHO MEAS - LV MASS(C)D: 128.3 GRAMS
BH CV ECHO MEAS - LV MAX PG: 1.9 MMHG
BH CV ECHO MEAS - LV MEAN PG: 1.08 MMHG
BH CV ECHO MEAS - LV SYSTOLIC VOL/BSA (12-30): 28.9 CM2
BH CV ECHO MEAS - LV V1 MAX: 69 CM/SEC
BH CV ECHO MEAS - LV V1 VTI: 13.6 CM
BH CV ECHO MEAS - LVIDD: 4.5 CM
BH CV ECHO MEAS - LVIDS: 3 CM
BH CV ECHO MEAS - LVOT AREA: 3 CM2
BH CV ECHO MEAS - LVOT DIAM: 1.94 CM
BH CV ECHO MEAS - LVPWD: 0.92 CM
BH CV ECHO MEAS - MR MAX PG: 64.1 MMHG
BH CV ECHO MEAS - MR MAX VEL: 400.4 CM/SEC
BH CV ECHO MEAS - MV DEC SLOPE: 469.9 CM/SEC2
BH CV ECHO MEAS - MV DEC TIME: 0.22 MSEC
BH CV ECHO MEAS - MV E MAX VEL: 68.6 CM/SEC
BH CV ECHO MEAS - MV MAX PG: 4.1 MMHG
BH CV ECHO MEAS - MV MEAN PG: 2.48 MMHG
BH CV ECHO MEAS - MV V2 VTI: 15.6 CM
BH CV ECHO MEAS - MVA(VTI): 2.6 CM2
BH CV ECHO MEAS - PA V2 MAX: 73.3 CM/SEC
BH CV ECHO MEAS - RAP SYSTOLE: 8 MMHG
BH CV ECHO MEAS - RV MAX PG: 0.78 MMHG
BH CV ECHO MEAS - RV V1 MAX: 44.1 CM/SEC
BH CV ECHO MEAS - RV V1 VTI: 9.1 CM
BH CV ECHO MEAS - RVOT DIAM: 1.93 CM
BH CV ECHO MEAS - RVSP: 31.6 MMHG
BH CV ECHO MEAS - SI(MOD-SP2): 29.5 ML/M2
BH CV ECHO MEAS - SI(MOD-SP4): 34.4 ML/M2
BH CV ECHO MEAS - SUP REN AO DIAM: 2.4 CM
BH CV ECHO MEAS - SV(LVOT): 40.2 ML
BH CV ECHO MEAS - SV(MOD-SP2): 48 ML
BH CV ECHO MEAS - SV(MOD-SP4): 56 ML
BH CV ECHO MEAS - SV(RVOT): 26.7 ML
BH CV ECHO MEAS - TR MAX PG: 23.6 MMHG
BH CV ECHO MEAS - TR MAX VEL: 243 CM/SEC
BH CV XLRA - RV BASE: 3.2 CM
BH CV XLRA - RV LENGTH: 7.2 CM
BH CV XLRA - RV MID: 2.31 CM
LEFT ATRIUM VOLUME INDEX: 43.5 ML/M2
MAXIMAL PREDICTED HEART RATE: 151 BPM
SINUS: 2.9 CM
STJ: 2.9 CM
STRESS TARGET HR: 128 BPM

## 2022-04-01 PROCEDURE — 93306 TTE W/DOPPLER COMPLETE: CPT

## 2022-04-01 PROCEDURE — 93000 ELECTROCARDIOGRAM COMPLETE: CPT | Performed by: NURSE PRACTITIONER

## 2022-04-01 PROCEDURE — 93306 TTE W/DOPPLER COMPLETE: CPT | Performed by: INTERNAL MEDICINE

## 2022-04-01 RX ORDER — AMIODARONE HYDROCHLORIDE 200 MG/1
200 TABLET ORAL 2 TIMES DAILY
Qty: 60 TABLET | Refills: 0 | Status: SHIPPED | OUTPATIENT
Start: 2022-04-01 | End: 2022-04-27 | Stop reason: SDUPTHER

## 2022-04-01 NOTE — PROGRESS NOTES
Procedure     ECG 12 Lead    Date/Time: 4/1/2022 8:13 AM  Performed by: India Oviedo DNP, APRN  Authorized by: India Oviedo DNP, APRN   Comparison: compared with previous ECG from 3/16/2022  Rhythm: atrial fibrillation  BPM: 97  Other findings: non-specific ST-T wave changes  Comments: No significant changes from previous EKG           Patient here for EKG after metop was added to her regimen.    Of note, patient did not start medication due to her concern over low b/p.    List of B/P and heart rates below:

## 2022-04-01 NOTE — TELEPHONE ENCOUNTER
Patient's average heart rate was 108 bpm on recent monitor.  She declined starting beta-blocker as she intermittently gets some low blood pressure readings at home, sometimes into the upper 90s or low 100s, and did not feel she would tolerate this.    Patient had echo done today.  EF visually decreased, felt to likely be related to A. fib with rapid rates.  Dr. Samuel reviewed echo results, EKG, and patient case and plan is to attempt cardioversion after she has been anticoagulated with Xarelto for adequate amount of time without missed doses.  We will go ahead and start amiodarone 200 mg twice a day at this time and recheck EKG in 1 week, per MD, in preparation of cardioversion.  We will also check BMP and magnesium level.    ---------------------    Called and discussed echo results and recommendations with patient.  She denies any chest pain or discomfort symptoms.  She would like to attempt cardioversion.  We discussed cardioversion procedure in detail as well as potential risks.  Patient verbalized understanding and would like to proceed.  We also discussed having her start amiodarone in preparation for cardioversion, as recommended by MD.  We discussed that this is a medication that needs to be monitored closely.  She is on board with EKG check in 1 week and will call sooner for any issues or concerns prior to that time.  She will stop by the outpatient lab 1 day within the next week to check BMP and magnesium level.  She reports she has been on Xarelto without any missed doses and taking at the same time every day, she is sure of this.  She verbalizes understanding of importance of continuing Xarelto without any missed doses and taking at the same time daily otherwise she could be at increased risk of stroke with cardioversion.

## 2022-04-01 NOTE — PROGRESS NOTES
Reviewed patient case and EKG with Dr. Samuel.  See telephone note.  Patient had echo today.  Blood pressure felt to not likely tolerate metoprolol at this point as she gets intermittently low readings however she will continue to monitor.

## 2022-04-04 ENCOUNTER — TELEPHONE (OUTPATIENT)
Dept: CARDIOLOGY | Facility: CLINIC | Age: 67
End: 2022-04-04

## 2022-04-04 NOTE — TELEPHONE ENCOUNTER
----- Message from Erich Samuel MD sent at 4/1/2022  5:00 PM EDT -----  Regarding: FW: Amiododarone   I have called patient and discussed amiodarone. She will start taking amiodarone.   Thanks     ----- Message -----  From: Jazmin Sheridan MA  Sent: 4/1/2022   1:33 PM EDT  To: Erich Samuel MD  Subject: FW: Amiododarone                                   ----- Message -----  From: Jackie Deleon  Sent: 4/1/2022  11:15 AM EDT  To: [facility]  Subject: Amiododarone                                     Rozina  Thank you for the phone call this morning. I have been reading about amiodarone. I have read that it “should only be used with patients who have been treated with other meds that did not work” or should be used only as “a last resort.” I am hesitant to start it.    Before beginning something like amiodarone, I would like to  find out why I went into a-fib in the first place.  Is it possible to proceed with the cardio version without taking amiodarone?    Thanks,  Jackie Deleon

## 2022-04-05 ENCOUNTER — LAB (OUTPATIENT)
Dept: LAB | Facility: HOSPITAL | Age: 67
End: 2022-04-05

## 2022-04-05 DIAGNOSIS — Z01.818 OTHER SPECIFIED PRE-OPERATIVE EXAMINATION: ICD-10-CM

## 2022-04-05 DIAGNOSIS — Z01.810 PREPROCEDURAL CARDIOVASCULAR EXAMINATION: ICD-10-CM

## 2022-04-05 DIAGNOSIS — Z13.6 SCREENING FOR CARDIOVASCULAR CONDITION: ICD-10-CM

## 2022-04-05 LAB
ANION GAP SERPL CALCULATED.3IONS-SCNC: 8.1 MMOL/L (ref 5–15)
BUN SERPL-MCNC: 22 MG/DL (ref 8–23)
BUN/CREAT SERPL: 28.9 (ref 7–25)
CALCIUM SPEC-SCNC: 9.3 MG/DL (ref 8.6–10.5)
CHLORIDE SERPL-SCNC: 106 MMOL/L (ref 98–107)
CO2 SERPL-SCNC: 25.9 MMOL/L (ref 22–29)
CREAT SERPL-MCNC: 0.76 MG/DL (ref 0.57–1)
EGFRCR SERPLBLD CKD-EPI 2021: 84.9 ML/MIN/1.73
GLUCOSE SERPL-MCNC: 95 MG/DL (ref 65–99)
MAGNESIUM SERPL-MCNC: 2 MG/DL (ref 1.6–2.4)
POTASSIUM SERPL-SCNC: 4.5 MMOL/L (ref 3.5–5.2)
SODIUM SERPL-SCNC: 140 MMOL/L (ref 136–145)

## 2022-04-05 PROCEDURE — 36415 COLL VENOUS BLD VENIPUNCTURE: CPT | Performed by: NURSE PRACTITIONER

## 2022-04-05 PROCEDURE — 80048 BASIC METABOLIC PNL TOTAL CA: CPT | Performed by: NURSE PRACTITIONER

## 2022-04-05 PROCEDURE — 83735 ASSAY OF MAGNESIUM: CPT | Performed by: NURSE PRACTITIONER

## 2022-04-05 NOTE — PROGRESS NOTES
Please let patient know that BMP unremarkable and magnesium level normal.  Proceed with plan of care as previously outlined and keep EKG check appointment as scheduled.  Call sooner for issues or concerns.

## 2022-04-08 ENCOUNTER — CLINICAL SUPPORT (OUTPATIENT)
Dept: CARDIOLOGY | Facility: CLINIC | Age: 67
End: 2022-04-08

## 2022-04-08 DIAGNOSIS — Z91.89 AT RISK FOR AMIODARONE TOXICITY WITH LONG TERM USE: ICD-10-CM

## 2022-04-08 DIAGNOSIS — I48.19 ATRIAL FIBRILLATION, PERSISTENT: Primary | ICD-10-CM

## 2022-04-08 DIAGNOSIS — Z79.899 AT RISK FOR AMIODARONE TOXICITY WITH LONG TERM USE: ICD-10-CM

## 2022-04-08 PROCEDURE — 93000 ELECTROCARDIOGRAM COMPLETE: CPT | Performed by: NURSE PRACTITIONER

## 2022-04-08 NOTE — PROGRESS NOTES
Procedure     ECG 12 Lead    Date/Time: 4/8/2022 9:37 AM  Performed by: Rocio Antony APRN  Authorized by: Rocio Antony APRN   Comparison: compared with previous ECG   Similar to previous ECG  Rhythm: atrial fibrillation  Comments: qtc stable        Pt presented to the office today for an EKG only visit.  Pt was recently started on Amiodarone in preparation for her upcoming cardioversion.  EKG was shown to ROBERTO CARLOS Castillo, who said pt is still in Afib.  Pt will keep upcoming appt./prt

## 2022-04-19 ENCOUNTER — TRANSCRIBE ORDERS (OUTPATIENT)
Dept: CARDIOLOGY | Facility: CLINIC | Age: 67
End: 2022-04-19

## 2022-04-19 DIAGNOSIS — Z13.6 SCREENING FOR CARDIOVASCULAR CONDITION: Primary | ICD-10-CM

## 2022-04-19 DIAGNOSIS — Z01.810 PREPROCEDURAL CARDIOVASCULAR EXAMINATION: ICD-10-CM

## 2022-04-21 RX ORDER — METOPROLOL SUCCINATE 25 MG/1
TABLET, EXTENDED RELEASE ORAL
Qty: 30 TABLET | Refills: 0 | OUTPATIENT
Start: 2022-04-21

## 2022-04-21 NOTE — TELEPHONE ENCOUNTER
Beta-Blockers Protocol Failed 04/21/2022 01:20 AM   Protocol Details  Active on medication list    No active pregnancy on record    No positive pregnancy test in past 12 months            Rx Refill Note  Requested Prescriptions     Pending Prescriptions Disp Refills   • metoprolol succinate XL (TOPROL-XL) 25 MG 24 hr tablet [Pharmacy Med Name: METOPROLOL SUCC ER 25 MG TAB] 30 tablet 0     Sig: TAKE 1 TABLET BY MOUTH EVERY DAY      Last office visit with prescribing clinician: Visit date not found      Next office visit with prescribing clinician: Visit date not found            ZULEYMA RALPH MA  04/21/22, 09:29 EDT

## 2022-04-23 ENCOUNTER — LAB (OUTPATIENT)
Dept: LAB | Facility: HOSPITAL | Age: 67
End: 2022-04-23

## 2022-04-23 DIAGNOSIS — Z01.818 OTHER SPECIFIED PRE-OPERATIVE EXAMINATION: ICD-10-CM

## 2022-04-23 DIAGNOSIS — Z13.6 SCREENING FOR CARDIOVASCULAR CONDITION: ICD-10-CM

## 2022-04-23 DIAGNOSIS — Z01.810 PREPROCEDURAL CARDIOVASCULAR EXAMINATION: ICD-10-CM

## 2022-04-23 LAB — SARS-COV-2 ORF1AB RESP QL NAA+PROBE: NOT DETECTED

## 2022-04-23 PROCEDURE — U0004 COV-19 TEST NON-CDC HGH THRU: HCPCS

## 2022-04-23 PROCEDURE — C9803 HOPD COVID-19 SPEC COLLECT: HCPCS

## 2022-04-25 NOTE — PROGRESS NOTES
Dr. Samuel not in the office today.  Please let patient know that preprocedure COVID test was negative.

## 2022-04-25 NOTE — PROGRESS NOTES
Reviewed results with Jackie Deleon and patient verbalized understanding.    Thank you,  Mariah Huizar RN  Triage Nurse [facility]

## 2022-04-26 ENCOUNTER — HOSPITAL ENCOUNTER (OUTPATIENT)
Dept: POSTOP/PACU | Facility: HOSPITAL | Age: 67
Discharge: HOME | End: 2022-04-26
Admitting: INTERNAL MEDICINE

## 2022-04-26 VITALS
SYSTOLIC BLOOD PRESSURE: 157 MMHG | OXYGEN SATURATION: 99 % | DIASTOLIC BLOOD PRESSURE: 88 MMHG | RESPIRATION RATE: 16 BRPM | TEMPERATURE: 98.3 F | HEART RATE: 62 BPM

## 2022-04-26 DIAGNOSIS — I48.19 ATRIAL FIBRILLATION, PERSISTENT: ICD-10-CM

## 2022-04-26 LAB — QT INTERVAL: 599 MS

## 2022-04-26 PROCEDURE — 93005 ELECTROCARDIOGRAM TRACING: CPT | Performed by: INTERNAL MEDICINE

## 2022-04-26 PROCEDURE — 92960 CARDIOVERSION ELECTRIC EXT: CPT

## 2022-04-26 PROCEDURE — 93010 ELECTROCARDIOGRAM REPORT: CPT | Performed by: INTERNAL MEDICINE

## 2022-04-26 NOTE — PROGRESS NOTES
Patient noted to be sinus rhythm and cardioversion cancelled.Continue amiodarone and Xarelto and follow up in cardiology clinic in one month.

## 2022-04-27 RX ORDER — AMIODARONE HYDROCHLORIDE 200 MG/1
200 TABLET ORAL DAILY
Qty: 90 TABLET | Refills: 3 | Status: SHIPPED | OUTPATIENT
Start: 2022-04-27 | End: 2022-06-03

## 2022-05-16 RX ORDER — METOPROLOL SUCCINATE 25 MG/1
TABLET, EXTENDED RELEASE ORAL
Qty: 30 TABLET | Refills: 0 | Status: SHIPPED | OUTPATIENT
Start: 2022-05-16 | End: 2022-12-05 | Stop reason: ALTCHOICE

## 2022-06-03 ENCOUNTER — OFFICE VISIT (OUTPATIENT)
Dept: CARDIOLOGY | Facility: CLINIC | Age: 67
End: 2022-06-03

## 2022-06-03 ENCOUNTER — HOSPITAL ENCOUNTER (OUTPATIENT)
Dept: CARDIOLOGY | Facility: HOSPITAL | Age: 67
Discharge: HOME | End: 2022-06-03
Admitting: INTERNAL MEDICINE

## 2022-06-03 VITALS
HEART RATE: 59 BPM | SYSTOLIC BLOOD PRESSURE: 116 MMHG | DIASTOLIC BLOOD PRESSURE: 88 MMHG | BODY MASS INDEX: 19.86 KG/M2 | WEIGHT: 123.6 LBS | HEIGHT: 66 IN

## 2022-06-03 DIAGNOSIS — R94.31 ABNORMAL EKG: ICD-10-CM

## 2022-06-03 DIAGNOSIS — I48.0 PAROXYSMAL ATRIAL FIBRILLATION: Primary | ICD-10-CM

## 2022-06-03 LAB
BH CV STRESS BP STAGE 1: NORMAL
BH CV STRESS BP STAGE 2: NORMAL
BH CV STRESS BP STAGE 3: NORMAL
BH CV STRESS BP STAGE 4: NORMAL
BH CV STRESS DURATION MIN STAGE 1: 3
BH CV STRESS DURATION MIN STAGE 2: 3
BH CV STRESS DURATION MIN STAGE 3: 3
BH CV STRESS DURATION MIN STAGE 4: 3
BH CV STRESS DURATION SEC STAGE 1: 0
BH CV STRESS DURATION SEC STAGE 2: 0
BH CV STRESS DURATION SEC STAGE 3: 0
BH CV STRESS DURATION SEC STAGE 4: 0
BH CV STRESS GRADE STAGE 1: 10
BH CV STRESS GRADE STAGE 2: 12
BH CV STRESS GRADE STAGE 3: 14
BH CV STRESS GRADE STAGE 4: 16
BH CV STRESS HR STAGE 1: 115
BH CV STRESS HR STAGE 2: 125
BH CV STRESS HR STAGE 3: 132
BH CV STRESS HR STAGE 4: 141
BH CV STRESS METS STAGE 1: 5
BH CV STRESS METS STAGE 2: 7.5
BH CV STRESS METS STAGE 3: 10
BH CV STRESS METS STAGE 4: 13.5
BH CV STRESS PROTOCOL 1: NORMAL
BH CV STRESS RECOVERY BP: NORMAL MMHG
BH CV STRESS RECOVERY HR: 94 BPM
BH CV STRESS SPEED STAGE 1: 1.7
BH CV STRESS SPEED STAGE 2: 2.5
BH CV STRESS SPEED STAGE 3: 3.4
BH CV STRESS SPEED STAGE 4: 4.2
BH CV STRESS STAGE 1: 1
BH CV STRESS STAGE 2: 2
BH CV STRESS STAGE 3: 3
BH CV STRESS STAGE 4: 4
MAXIMAL PREDICTED HEART RATE: 151 BPM
PERCENT MAX PREDICTED HR: 93.38 %
STRESS BASELINE BP: NORMAL MMHG
STRESS BASELINE HR: 73 BPM
STRESS PERCENT HR: 110 %
STRESS POST ESTIMATED WORKLOAD: 13.5 METS
STRESS POST EXERCISE DUR MIN: 12 MIN
STRESS POST EXERCISE DUR SEC: 0 SEC
STRESS POST PEAK BP: NORMAL MMHG
STRESS POST PEAK HR: 141 BPM
STRESS TARGET HR: 128 BPM

## 2022-06-03 PROCEDURE — 93017 CV STRESS TEST TRACING ONLY: CPT

## 2022-06-03 PROCEDURE — 93000 ELECTROCARDIOGRAM COMPLETE: CPT | Performed by: INTERNAL MEDICINE

## 2022-06-03 PROCEDURE — 93016 CV STRESS TEST SUPVJ ONLY: CPT | Performed by: INTERNAL MEDICINE

## 2022-06-03 PROCEDURE — 99214 OFFICE O/P EST MOD 30 MIN: CPT | Performed by: INTERNAL MEDICINE

## 2022-06-03 PROCEDURE — 93018 CV STRESS TEST I&R ONLY: CPT | Performed by: INTERNAL MEDICINE

## 2022-06-03 RX ORDER — AMIODARONE HYDROCHLORIDE 100 MG/1
200 TABLET ORAL DAILY
Start: 2022-06-03 | End: 2022-12-05 | Stop reason: ALTCHOICE

## 2022-06-03 NOTE — PROGRESS NOTES
PATIENTINFORMATION    Date of Office Visit: 2022  Encounter Provider: Erich Samuel MD  Place of Service: [facility] CARDIOLOGY  Patient Name: Jackie Deleon  : 1955    Subjective:     Encounter Date:2022      Patient ID: Jackie Deleon is a 67 y.o. female.    Chief Complaint   Patient presents with   • Follow-up   • Atrial Fibrillation     HPI  Ms. Deleon is a is not 67 years old lady who is here for follow-up visit.  She was diagnosed with persistent atrial fibrillation in 2022 and was scheduled to have cardioversion but converted to sinus after she was started on amiodarone.  She was not started on AV el blocker because of borderline low blood pressure.  She is on blood thinner without any side effects.  She denies any significant new symptoms today  She walks/exercise regularly for about an hour without any significant symptoms.    ROS  All systems reviewed and negative except as noted in HPI.    Past Medical History:   Diagnosis Date   • Anxiety    • Osteopenia    • Osteoporosis        Past Surgical History:   Procedure Laterality Date   • D & C AND LAPAROSCOPY     • TONSILLECTOMY         Social History     Socioeconomic History   • Marital status:    • Number of children: 2   Tobacco Use   • Smoking status: Never Smoker   • Smokeless tobacco: Never Used   • Tobacco comment: caffeine use   Substance and Sexual Activity   • Alcohol use: Yes     Comment: 5-7/week   • Drug use: No   • Sexual activity: Defer       Family History   Problem Relation Age of Onset   • Hypertension Mother    • Multiple myeloma Mother    • Hypertension Father    • Prostate cancer Father    • Breast cancer Paternal Aunt    • Heart disease Maternal Grandfather    • Stroke Maternal Grandfather            ECG 12 Lead    Date/Time: 6/3/2022 9:27 AM  Performed by: Erich Samuel MD  Authorized by: Erich Samuel MD   Comparison: compared with previous ECG from  "4/26/2022  Rhythm: sinus rhythm  Rate: normal  Conduction: conduction normal  ST Segments: ST segments normal  T Waves: T waves normal  QRS axis: normal  Other: no other findings    Clinical impression: normal ECG               Objective:     /88 (BP Location: Left arm, Patient Position: Sitting)   Pulse 59   Ht 167.6 cm (66\")   Wt 56.1 kg (123 lb 9.6 oz)   BMI 19.95 kg/m²  Body mass index is 19.95 kg/m².     Constitutional:       General: Not in acute distress.     Appearance: Well-developed. Not diaphoretic.   Eyes:      Pupils: Pupils are equal, round, and reactive to light.   HENT:      Head: Normocephalic and atraumatic.   Neck:      Thyroid: No thyromegaly.   Pulmonary:      Effort: Pulmonary effort is normal. No respiratory distress.      Breath sounds: Normal breath sounds. No wheezing. No rales.   Chest:      Chest wall: Not tender to palpatation.   Cardiovascular:      Normal rate. Regular rhythm.      No gallop.   Pulses:     Intact distal pulses.   Edema:     Peripheral edema absent.   Abdominal:      General: Bowel sounds are normal. There is no distension.      Palpations: Abdomen is soft.      Tenderness: There is no guarding.   Musculoskeletal: Normal range of motion.         General: No deformity.      Cervical back: Normal range of motion and neck supple. Skin:     General: Skin is warm and dry.      Findings: No rash.   Neurological:      Mental Status: Alert and oriented to person, place, and time.      Cranial Nerves: No cranial nerve deficit.      Deep Tendon Reflexes: Reflexes are normal and symmetric.   Psychiatric:         Judgment: Judgment normal.         Review Of Data: I have reviewed pertinent recent labs, images and documents and pertinent findings included in HPI or assessment below.    Lipid Panel    Lipid Panel 6/22/21   Total Cholesterol 166   Triglycerides 25   HDL Cholesterol 104 (A)   VLDL Cholesterol 7   LDL Cholesterol  55   LDL/HDL Ratio 0.55   (A) Abnormal value  "      Comments are available for some flowsheets but are not being displayed.           EchoCardiogram in April 2022  · Left ventricular ejection fraction appears to be 46 - 50%. Left ventricular systolic function is mildly decreased. There is mild hypokinesis of the anterior septum.  · Left ventricular diastolic function is consistent with (grade I) impaired relaxation.  · Moderate tricuspid valve regurgitation is present.  · There is mild bileaflet mitral valve prolapse present with mild mitral valve regurgitation.  · Mild dilation of the ascending aorta is present measuring 3.8 cm.  · No evidence of pulmonary hypertension is present.  · Atrial fibrillation was the predominant rhythm observed during the procedure        Assessment/Plan:         1.  Paroxysmal atrial fibrillation-converted to sinus  after she was started on amiodarone- Planned DCCV canceled   [id number] score of 2: Age and gender  2.  Valvular heart disease: Moderate TR/mild MR  3.  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  Mild thoracic aortic aneurysm: Ascending aorta measuring 3.8 cm    She remains in sinus rhythm  During treadmill test to rule out any significant coronary artery disease  Decrease amiodarone to 100 mg p.o. daily.  May DC during follow-up visit in 6 months.      Diagnosis and plan of care discussed with patient and verbalized understanding.            Your medication list          Accurate as of Ivett 3, 2022  9:30 AM. If you have any questions, ask your nurse or doctor.            CHANGE how you take these medications      Instructions Last Dose Given Next Dose Due   amiodarone 100 MG tablet  Commonly known as: PACERONE  What changed: medication strength  Changed by: Erich Samuel MD      Take 2 tablets by mouth Daily.          CONTINUE taking these medications      Instructions Last Dose Given Next Dose Due   B-12 100-5000 MCG sublingual  tablet           Calcium-Vitamin D-Vitamin K 500-500-40 MG-UNT-MCG chewable tablet      Chew.       Sera-C 500-550 MG tablet           glucosamine-chondroitin 500-400 MG capsule capsule      Take  by mouth 3 (three) times a day with meals.       metoprolol succinate XL 25 MG 24 hr tablet  Commonly known as: TOPROL-XL      TAKE 1 TABLET BY MOUTH EVERY DAY       rivaroxaban 20 MG tablet  Commonly known as: Xarelto      Take 1 tablet by mouth Daily.       Turmeric 500 MG tablet      Take  by mouth. 1,000             Where to Get Your Medications      Information about where to get these medications is not yet available    Ask your nurse or doctor about these medications  · amiodarone 100 MG tablet             Erich Samuel MD  06/03/22  09:30 EDT

## 2022-08-02 RX ORDER — RIVAROXABAN 20 MG/1
TABLET, FILM COATED ORAL
Qty: 90 TABLET | Refills: 0 | Status: SHIPPED | OUTPATIENT
Start: 2022-08-02 | End: 2022-12-12

## 2022-09-17 NOTE — NURSING NOTE
Problem: Discharge Planning  Goal: Discharge to home or other facility with appropriate resources  Outcome: Completed     Problem: Safety - Adult  Goal: Free from fall injury  Outcome: Completed     Problem: Pain  Goal: Verbalizes/displays adequate comfort level or baseline comfort level  Outcome: Completed     Problem: ABCDS Injury Assessment  Goal: Absence of physical injury  Outcome: Completed     Problem: Chronic Conditions and Co-morbidities  Goal: Patient's chronic conditions and co-morbidity symptoms are monitored and maintained or improved  Outcome: Completed Pt arrived to PACU for scheduled cardioversion with Dr. Samuel.  Patient was found to be in sinus rhythm and verified by EKG.  Call was placed to [facility] to let them know of pt's rhythm.  Neelima. Card. Nurse called back and said to discharge patient and Dr. Samuel will call patient for follow up.

## 2022-12-05 ENCOUNTER — OFFICE VISIT (OUTPATIENT)
Dept: CARDIOLOGY | Facility: CLINIC | Age: 67
End: 2022-12-05

## 2022-12-05 VITALS
WEIGHT: 126.6 LBS | BODY MASS INDEX: 29.3 KG/M2 | DIASTOLIC BLOOD PRESSURE: 80 MMHG | SYSTOLIC BLOOD PRESSURE: 130 MMHG | HEIGHT: 55 IN | HEART RATE: 58 BPM | OXYGEN SATURATION: 97 %

## 2022-12-05 DIAGNOSIS — I48.0 PAROXYSMAL ATRIAL FIBRILLATION: Primary | ICD-10-CM

## 2022-12-05 DIAGNOSIS — I42.9 CARDIOMYOPATHY, UNSPECIFIED TYPE: ICD-10-CM

## 2022-12-05 DIAGNOSIS — I77.810 THORACIC AORTIC ECTASIA: ICD-10-CM

## 2022-12-05 DIAGNOSIS — Z79.01 CHRONIC ANTICOAGULATION: ICD-10-CM

## 2022-12-05 PROCEDURE — 93000 ELECTROCARDIOGRAM COMPLETE: CPT | Performed by: NURSE PRACTITIONER

## 2022-12-05 PROCEDURE — 99214 OFFICE O/P EST MOD 30 MIN: CPT | Performed by: NURSE PRACTITIONER

## 2022-12-05 NOTE — PROGRESS NOTES
Date of Office Visit: 2022  Encounter Provider: India Oviedo, GALINDO, ROBERTO CARLOS  Place of Service: [facility] CARDIOLOGY  Patient Name: Jackie Deleon  :1955        Subjective:     Chief Complaint:  Paroxysmal atrial fibrillation, possible tachycardia mediated cardiomyopathy (mild)      History of Present Illness:  Jackie Deleon is a 67 y.o. female patient of Dr. Samuel.  I am seeing this patient in the office today and I reviewed her records.    Patient has a history of paroxysmal atrial fibrillation, valvular regurgitation, mild cardiomyopathy felt to likely be tachycardia induced, mild thoracic aortic ectasia.    Patient was in persistent atrial fibrillation 3/2022.  She was started on amiodarone with plans for DCCV in the evening.  She was not started on AV el blocker due to borderline low BP.  She was started on blood thinner without issues.  She spontaneously converted to sinus rhythm and cardioversion was canceled.  Her amiodarone dose was decreased.  Shortly 2022 with plans to consider stopping next visit.  She was continued on Xarelto.  She did have echo showing mildly decreased EF fraction.  Treadmill stress test without evidence of ischemia at 13.5 METS.      Patient presents to office today for follow-up appointment.  Patient reports she has been doing well since last visit.  She had a brief episode of atrial fibrillation in the beginning of November lasting no more than a couple of hours.  She reports heart rate was not above 120 with this.  She remains on Xarelto without falls or abnormal bleeding.  She feels very strongly about stopping amiodarone.  She has not been taking metoprolol XL and she reports she has never taken this medication.  She reports blood pressure at home usually 110-120/80-85.  She remains active and continues to exercise and denies chest pain or discomfort, dyspnea, dyspnea with exertion, edema, dizziness, syncope, near syncope,  falls, fatigue, or abnormal bleeding.        Past Medical History:   Diagnosis Date   • Abnormal ECG 03/14/22   • Anxiety    • Atrial fibrillation (HCC) 03/14/22   • Osteopenia    • Osteoporosis      Past Surgical History:   Procedure Laterality Date   • D & C AND LAPAROSCOPY     • TONSILLECTOMY       Outpatient Medications Prior to Visit   Medication Sig Dispense Refill   • Bioflavonoid Products (Sera-C) 500-550 MG tablet      • Calcium-Vitamin D-Vitamin K 500-500-40 MG-UNT-MCG chewable tablet Chew.     • Cobalamin Combinations (B-12) 100-5000 MCG sublingual tablet      • glucosamine-chondroitin 500-400 MG capsule capsule Take  by mouth 3 (three) times a day with meals.     • Turmeric 500 MG tablet Take  by mouth. 1,000     • Xarelto 20 MG tablet TAKE 1 TABLET BY MOUTH EVERY DAY 90 tablet 0   • amiodarone (PACERONE) 100 MG tablet Take 2 tablets by mouth Daily.     • metoprolol succinate XL (TOPROL-XL) 25 MG 24 hr tablet TAKE 1 TABLET BY MOUTH EVERY DAY 30 tablet 0     No facility-administered medications prior to visit.       Allergies as of 12/05/2022   • (No Known Allergies)     Social History     Socioeconomic History   • Marital status:    • Number of children: 2   Tobacco Use   • Smoking status: Never   • Smokeless tobacco: Never   • Tobacco comments:     caffeine use   Substance and Sexual Activity   • Alcohol use: Yes     Alcohol/week: 3.0 - 5.0 standard drinks     Types: 3 - 5 Glasses of wine per week     Comment: 5-7/week   • Drug use: No   • Sexual activity: Yes     Partners: Male     Birth control/protection: None     Family History   Problem Relation Age of Onset   • Hypertension Mother    • Multiple myeloma Mother    • Hypertension Father    • Prostate cancer Father    • Breast cancer Paternal Aunt    • Heart disease Maternal Grandfather    • Stroke Maternal Grandfather        Review of Systems   Constitutional: Negative for malaise/fatigue.   Cardiovascular:        SEE HPI   Respiratory:  "Negative for shortness of breath.    Hematologic/Lymphatic: Negative for bleeding problem.   Musculoskeletal: Negative for falls.   Gastrointestinal: Negative for melena.   Genitourinary: Negative for hematuria.   Neurological: Negative for dizziness.   Psychiatric/Behavioral: Negative for altered mental status.          Objective:     Vitals:    12/05/22 0948   BP: 130/80   BP Location: Left arm   Patient Position: Sitting   Cuff Size: Adult   Pulse: 58   SpO2: 97%   Weight: 57.4 kg (126 lb 9.6 oz)   Height: 66 cm (25.98\")     Body mass index is 131.83 kg/m².      PHYSICAL EXAM:  Constitutional:       General: Not in acute distress.     Appearance: Well-developed. Not diaphoretic.   Eyes:      Pupils: Pupils are equal, round, and reactive to light.   HENT:      Head: Normocephalic and atraumatic.   Pulmonary:      Effort: Pulmonary effort is normal. No respiratory distress.      Breath sounds: Normal breath sounds. No wheezing. No rales.   Cardiovascular:      Normal rate. Regular rhythm.      Murmurs: There is no murmur.      No gallop. No click. No rub.   Edema:     Peripheral edema absent.   Abdominal:      General: Bowel sounds are normal.      Palpations: Abdomen is soft.   Musculoskeletal:         General: No tenderness or deformity. Skin:     General: Skin is warm and dry.      Findings: No erythema.   Neurological:      Mental Status: Alert and oriented to person, place, and time.   Psychiatric:         Behavior: Behavior normal.             ECG 12 Lead    Date/Time: 12/5/2022 10:03 AM  Performed by: India Oviedo DNP, ROBERTO CARLOS  Authorized by: India Oviedo DNP, ROBERTO CARLOS   Comparison: compared with previous ECG from 6/3/2022  Rhythm: sinus rhythm  BPM: 58  Conduction: 1st degree AV block  Other findings: non-specific ST-T wave changes  Comments: No significant changes from previous EKGs              Assessment:       Diagnosis Plan   1. Paroxysmal atrial fibrillation (HCC)  Adult Transthoracic Echo " Limited W/ Cont if Necessary Per Protocol      2. Chronic anticoagulation        3. Cardiomyopathy, unspecified type (HCC)  Adult Transthoracic Echo Limited W/ Cont if Necessary Per Protocol      4. Thoracic aortic ectasia (HCC)  Adult Transthoracic Echo Limited W/ Cont if Necessary Per Protocol            Plan:     1. Paroxysmal atrial fibrillation: In sinus rhythm in the office today on decreased dose of amiodarone. Patient really wanting to stop the amiodarone at this point.  Recommend starting daily metoprolol XL at bedtime but she is concerned her blood pressure is too low for this and really does not want to take another scheduled med. Aware of risks of recurrent a fib episodes and tachycardia.  She is agreeable to try PRN metoprolol tartrate for prolonged a fib episode or if tachycardic in a fib but will hold for SBP <100.  She is agreeable to continue xarelto as she feels she tolerates this well.  She is aware of stroke risk with atrial fibrillation.  She does have an apple watch where she can monitor her EKG strips and if she has recurrent a fib she will notify the office.  2. Mild cardiomyopathy: Felt to likely be tachycardia mediated.  EF was 46 to 50% on 4/2022 echo.  Again recommended metoprolol XL and discussed potential benefits but she declines.  Recheck limited echo now that she is maintaining sinus rhythm.  3. Valvular heart disease: Moderate TR and mild MR echo  4. Nonspecific ST-T wave changes on EKG: Normal treadmill stress test 6/2022 without evidence of ischemia at a workload of 13.5 METS.  Denies anginal symptoms.  5. Mild thoracic aortic aneurysm: Ascending aorta 3.8 cm.  We will see if this is well visualized on follow-up limited echo, as above.    Patient to follow-up with Dr. Samuel in 6 months or follow-up sooner if needed for any new, recurrent, or worsening symptoms or concerns.  Discussed in detail signs/symptoms that warrant sooner call or follow-up to the office or ER  visit.           Your medication list          Accurate as of December 5, 2022 10:44 AM. If you have any questions, ask your nurse or doctor.            START taking these medications      Instructions Last Dose Given Next Dose Due   metoprolol tartrate 25 MG tablet  Commonly known as: LOPRESSOR  Started by: India Oviedo DNP, APRN      Take 1 tablet by mouth As Needed (for a fib episode.  Hold for top BP # below 100 or HR below 60).          CONTINUE taking these medications      Instructions Last Dose Given Next Dose Due   B-12 100-5000 MCG sublingual tablet           Calcium-Vitamin D-Vitamin K 500-500-40 MG-UNT-MCG chewable tablet      Chew.       Sera-C 500-550 MG tablet           glucosamine-chondroitin 500-400 MG capsule capsule      Take  by mouth 3 (three) times a day with meals.       Turmeric 500 MG tablet      Take  by mouth. 1,000       Xarelto 20 MG tablet  Generic drug: rivaroxaban      TAKE 1 TABLET BY MOUTH EVERY DAY             Where to Get Your Medications      These medications were sent to [facility]/pharmacy #[id number] - [place], [place] - [address] AT [facility] - [phone]  - [phone] [address] [address]    Phone: [phone]   · metoprolol tartrate 25 MG tablet         The above medication changes may not have been made by this provider.  Patient's medication list was updated to reflect medications they are currently taking including medication changes made by other providers.            Thanks,    India Oviedo DNP, APRN  12/05/2022         Dictated utilizing Dragon dictation

## 2022-12-12 RX ORDER — RIVAROXABAN 20 MG/1
TABLET, FILM COATED ORAL
Qty: 90 TABLET | Refills: 0 | Status: SHIPPED | OUTPATIENT
Start: 2022-12-12 | End: 2023-01-03 | Stop reason: SDUPTHER

## 2023-01-03 ENCOUNTER — HOSPITAL ENCOUNTER (OUTPATIENT)
Dept: CARDIOLOGY | Facility: HOSPITAL | Age: 68
Discharge: HOME | End: 2023-01-03
Admitting: NURSE PRACTITIONER
Payer: MEDICARE

## 2023-01-03 ENCOUNTER — TELEPHONE (OUTPATIENT)
Dept: CARDIOLOGY | Facility: CLINIC | Age: 68
End: 2023-01-03
Payer: MEDICARE

## 2023-01-03 VITALS
HEART RATE: 76 BPM | SYSTOLIC BLOOD PRESSURE: 120 MMHG | HEIGHT: 62 IN | DIASTOLIC BLOOD PRESSURE: 64 MMHG | WEIGHT: 126 LBS | BODY MASS INDEX: 23.19 KG/M2

## 2023-01-03 DIAGNOSIS — I77.810 THORACIC AORTIC ECTASIA: ICD-10-CM

## 2023-01-03 DIAGNOSIS — I48.0 PAROXYSMAL ATRIAL FIBRILLATION: ICD-10-CM

## 2023-01-03 DIAGNOSIS — I42.9 CARDIOMYOPATHY, UNSPECIFIED TYPE: ICD-10-CM

## 2023-01-03 LAB
AORTIC ARCH: 2.1 CM
ASCENDING AORTA: 3.8 CM
BH CV ECHO MEAS - ACS: 2.15 CM
BH CV ECHO MEAS - AO MAX PG: 4.4 MMHG
BH CV ECHO MEAS - AO MEAN PG: 2 MMHG
BH CV ECHO MEAS - AO ROOT DIAM: 3.5 CM
BH CV ECHO MEAS - AO V2 MAX: 105 CM/SEC
BH CV ECHO MEAS - AO V2 VTI: 23.8 CM
BH CV ECHO MEAS - AVA(I,D): 2.9 CM2
BH CV ECHO MEAS - EDV(CUBED): 87.3 ML
BH CV ECHO MEAS - EDV(MOD-SP2): 70 ML
BH CV ECHO MEAS - EDV(MOD-SP4): 96 ML
BH CV ECHO MEAS - EF(MOD-BP): 57.5 %
BH CV ECHO MEAS - EF(MOD-SP2): 55.7 %
BH CV ECHO MEAS - EF(MOD-SP4): 56.3 %
BH CV ECHO MEAS - ESV(CUBED): 24.3 ML
BH CV ECHO MEAS - ESV(MOD-SP2): 31 ML
BH CV ECHO MEAS - ESV(MOD-SP4): 42 ML
BH CV ECHO MEAS - FS: 34.7 %
BH CV ECHO MEAS - IVS/LVPW: 1.21 CM
BH CV ECHO MEAS - IVSD: 0.95 CM
BH CV ECHO MEAS - LAT PEAK E' VEL: 12.3 CM/SEC
BH CV ECHO MEAS - LV DIASTOLIC VOL/BSA (35-75): 61.1 CM2
BH CV ECHO MEAS - LV MASS(C)D: 123.4 GRAMS
BH CV ECHO MEAS - LV MAX PG: 4.3 MMHG
BH CV ECHO MEAS - LV MEAN PG: 2 MMHG
BH CV ECHO MEAS - LV SYSTOLIC VOL/BSA (12-30): 26.7 CM2
BH CV ECHO MEAS - LV V1 MAX: 104 CM/SEC
BH CV ECHO MEAS - LV V1 VTI: 22.3 CM
BH CV ECHO MEAS - LVIDD: 4.4 CM
BH CV ECHO MEAS - LVIDS: 2.9 CM
BH CV ECHO MEAS - LVOT AREA: 3 CM2
BH CV ECHO MEAS - LVOT DIAM: 1.97 CM
BH CV ECHO MEAS - LVPWD: 0.78 CM
BH CV ECHO MEAS - MED PEAK E' VEL: 9.8 CM/SEC
BH CV ECHO MEAS - MR MAX PG: 10.2 MMHG
BH CV ECHO MEAS - MR MAX VEL: 159.8 CM/SEC
BH CV ECHO MEAS - MV A DUR: 0.11 SEC
BH CV ECHO MEAS - MV A MAX VEL: 57.4 CM/SEC
BH CV ECHO MEAS - MV DEC TIME: 0.27 MSEC
BH CV ECHO MEAS - MV E MAX VEL: 66.8 CM/SEC
BH CV ECHO MEAS - MV E/A: 1.16
BH CV ECHO MEAS - PULM A REVS DUR: 0.08 SEC
BH CV ECHO MEAS - PULM A REVS VEL: 27.9 CM/SEC
BH CV ECHO MEAS - PULM DIAS VEL: 48.4 CM/SEC
BH CV ECHO MEAS - PULM S/D: 0.96
BH CV ECHO MEAS - PULM SYS VEL: 46.3 CM/SEC
BH CV ECHO MEAS - RAP SYSTOLE: 3 MMHG
BH CV ECHO MEAS - RVSP: 21 MMHG
BH CV ECHO MEAS - SI(MOD-SP2): 24.8 ML/M2
BH CV ECHO MEAS - SI(MOD-SP4): 34.4 ML/M2
BH CV ECHO MEAS - SUP REN AO DIAM: 2.2 CM
BH CV ECHO MEAS - SV(LVOT): 68 ML
BH CV ECHO MEAS - SV(MOD-SP2): 39 ML
BH CV ECHO MEAS - SV(MOD-SP4): 54 ML
BH CV ECHO MEAS - TR MAX PG: 18.4 MMHG
BH CV ECHO MEAS - TR MAX VEL: 214.6 CM/SEC
BH CV ECHO MEASUREMENTS AVERAGE E/E' RATIO: 6.05
BH CV XLRA - TDI S': 11.6 CM/SEC
LEFT ATRIUM VOLUME INDEX: 34.1 ML/M2
MAXIMAL PREDICTED HEART RATE: 150 BPM
SINUS: 3.5 CM
STJ: 3.3 CM
STRESS TARGET HR: 128 BPM

## 2023-01-03 PROCEDURE — 93308 TTE F-UP OR LMTD: CPT

## 2023-01-03 PROCEDURE — 93325 DOPPLER ECHO COLOR FLOW MAPG: CPT

## 2023-01-03 PROCEDURE — 93321 DOPPLER ECHO F-UP/LMTD STD: CPT | Performed by: INTERNAL MEDICINE

## 2023-01-03 PROCEDURE — 93321 DOPPLER ECHO F-UP/LMTD STD: CPT

## 2023-01-03 PROCEDURE — 93325 DOPPLER ECHO COLOR FLOW MAPG: CPT | Performed by: INTERNAL MEDICINE

## 2023-01-03 PROCEDURE — 93308 TTE F-UP OR LMTD: CPT | Performed by: INTERNAL MEDICINE

## 2023-01-03 NOTE — PROGRESS NOTES
Please let patient know that the pumping function of her heart has improved/normalized now that she is in sinus rhythm.  Mild dilation of the thoracic aorta appears stable.  Backflow of blood through heart valves has decreased/improved, only trace.  Continue current medications including xarelto.  Would keep June follow-up with Dr. Samuel as scheduled or call sooner for issues or concerns.

## 2023-01-03 NOTE — TELEPHONE ENCOUNTER
----- Message from India Oviedo, DNP, APRN sent at 1/3/2023  2:32 PM EST -----  Please let patient know that the pumping function of her heart has improved/normalized now that she is in sinus rhythm.  Mild dilation of the thoracic aorta appears stable.  Backflow of blood through heart valves has decreased/improved, only trace.  Continue current medications including xarelto.  Would keep June follow-up with Dr. Samuel as scheduled or call sooner for issues or concerns.

## 2023-01-03 NOTE — TELEPHONE ENCOUNTER
Called and left VM. Will continue to try to reach patient.     Keren Walton RN  Triage [facility]

## 2023-01-04 NOTE — TELEPHONE ENCOUNTER
Called and left VM. Will continue to try to reach patient.     Keren Walton RN  Triage [facility]

## 2023-01-05 NOTE — TELEPHONE ENCOUNTER
Notified patient of results and recommendations, patient verbalizes understanding.    Alisha Asher RN  [facility] Triage  01/05/23 14:53 EST

## 2023-01-05 NOTE — TELEPHONE ENCOUNTER
Called and left VM. Will continue to try to reach patient.     Keren Walton RN  Triage [facility] [place]

## 2023-01-23 ENCOUNTER — TELEPHONE (OUTPATIENT)
Dept: GASTROENTEROLOGY | Facility: CLINIC | Age: 68
End: 2023-01-23

## 2023-01-23 NOTE — TELEPHONE ENCOUNTER
Caller: Jackie Deleon    Relationship to patient: Self    Best call back number: [phone]    Chief complaint: 10 YEAR CHECK, HX OF  POLYPS    Type of visit: COLONOSCOPY    Requested date: PLEASE CALL TO SCHEDULE    Additional notes:CAN BE REACHED ANY TIME USUALLY UP EARLY.    LAST COLONOSCOPY WAS WITH ROBBIE MONIQUE

## 2023-01-25 ENCOUNTER — PRE-PROCEDURE SCREENING (OUTPATIENT)
Dept: GASTROENTEROLOGY | Facility: CLINIC | Age: 68
End: 2023-01-25
Payer: MEDICARE

## 2023-01-25 NOTE — TELEPHONE ENCOUNTER
LAST SCOPE 2/13 IN Epic --Personal history of polyps--No family history of polyps or colon cancer--XARELTO--Medications:                Bioflavonoid Products (Sera-C) 500-550 MG tablet  Calcium-Vitamin D-Vitamin K 500-500-40 MG-UNT-MCG chewable tablet  Cobalamin Combinations (B-12) 100-5000 MCG sublingual tablet  glucosamine-chondroitin 500-400 MG capsule capsule    metoprolol tartrate (LOPRESSOR) 25 MG tablet    rivaroxaban (Xarelto) 20 MG tablet  Turmeric 500 MG tablet           LAB REPORT ON [facility]      QUESTIONNAIRE SCEEENING  HAS BEEN SENT TO DOCTOR FOR REVIEW

## 2023-01-26 DIAGNOSIS — Z12.12 SCREENING FOR COLORECTAL CANCER: Primary | ICD-10-CM

## 2023-01-26 DIAGNOSIS — Z12.11 SCREENING FOR COLORECTAL CANCER: Primary | ICD-10-CM

## 2023-01-27 NOTE — TELEPHONE ENCOUNTER
Spoke to patient. She said she did have an episode of afib about 3 weeks ago. However, she took her metoprolol and converted right after. It only lasted for an hour. She said it was brought on by extreme stress.     Keren Walton RN  Triage [facility]

## 2023-01-27 NOTE — TELEPHONE ENCOUNTER
Always a risk with holding AC but also a risk of bleeding if not held and has polyps removed.    Any recent a fib episodes?  If maintaining sinus rhythm then ok to cautiously hold 48 hours prior.  Would recommend minimizing time off of blood thinner and resuming as soon as possible after the procedure once felt to be safe from a bleeding standpoint, to be determined by managing provider.

## 2023-02-15 ENCOUNTER — TELEPHONE (OUTPATIENT)
Dept: GASTROENTEROLOGY | Facility: CLINIC | Age: 68
End: 2023-02-15

## 2023-02-15 NOTE — TELEPHONE ENCOUNTER
Hub staff attempted to follow warm transfer process and was unsuccessful     Caller: Jackie Deleon    Relationship to patient: Self    Best call back number: [phone]    Patient is needing: PT RETURNING CALL TO SCHEDULE COLONOSCOPY. CALL BACK ANYTIME OK TO [facility].

## 2023-02-21 ENCOUNTER — OFFICE VISIT (OUTPATIENT)
Dept: FAMILY MEDICINE CLINIC | Facility: CLINIC | Age: 68
End: 2023-02-21
Payer: MEDICARE

## 2023-02-21 VITALS
HEIGHT: 62 IN | OXYGEN SATURATION: 96 % | BODY MASS INDEX: 23.19 KG/M2 | RESPIRATION RATE: 18 BRPM | WEIGHT: 126 LBS | HEART RATE: 57 BPM | SYSTOLIC BLOOD PRESSURE: 112 MMHG | DIASTOLIC BLOOD PRESSURE: 82 MMHG

## 2023-02-21 DIAGNOSIS — R79.9 ABNORMAL FINDING OF BLOOD CHEMISTRY, UNSPECIFIED: ICD-10-CM

## 2023-02-21 DIAGNOSIS — M85.80 OSTEOPENIA, UNSPECIFIED LOCATION: ICD-10-CM

## 2023-02-21 DIAGNOSIS — Z13.220 SCREENING, LIPID: ICD-10-CM

## 2023-02-21 DIAGNOSIS — Z86.39 HISTORY OF IRON DEFICIENCY: ICD-10-CM

## 2023-02-21 DIAGNOSIS — I48.0 PAROXYSMAL ATRIAL FIBRILLATION: Primary | ICD-10-CM

## 2023-02-21 DIAGNOSIS — Z78.0 POSTMENOPAUSAL: ICD-10-CM

## 2023-02-21 DIAGNOSIS — F41.9 ANXIETY: ICD-10-CM

## 2023-02-21 DIAGNOSIS — Z12.31 BREAST CANCER SCREENING BY MAMMOGRAM: ICD-10-CM

## 2023-02-21 DIAGNOSIS — M85.88 OTHER SPECIFIED DISORDERS OF BONE DENSITY AND STRUCTURE, OTHER SITE: ICD-10-CM

## 2023-02-21 PROBLEM — M81.0 OSTEOPOROSIS: Status: ACTIVE | Noted: 2022-03-14

## 2023-02-21 PROBLEM — I48.91 ATRIAL FIBRILLATION: Status: ACTIVE | Noted: 2022-03-14

## 2023-02-21 PROCEDURE — 99214 OFFICE O/P EST MOD 30 MIN: CPT | Performed by: NURSE PRACTITIONER

## 2023-02-21 RX ORDER — CHLORAL HYDRATE 500 MG
CAPSULE ORAL
COMMUNITY
Start: 2023-01-02

## 2023-02-21 NOTE — PROGRESS NOTES
Subjective   Jackie Deleon is a 68 y.o. female.     History of Present Illness   Estab pt she prev saw Lesley Moncada and left to see Kely Luis MD and is back to re establish care.     Acute concerns for worsening anxiety. She takes metoprolol  25 as prn. She reports sleeping well. She reports family stress.  Patient does not drink a lot of caffeine.  Her heart rate today is 57.  ALEJANDRO-7 score: 3.  She denies SI/HI.    History of osteopenia. She walks daily, no falls. She is active, no weights. No hx of fracture. Last dexa 2 years ago osteopenia.  She would like to update screening with mammogram and DEXA scan today.  She is interested and labs.    Patient with history of atrial fibs.  She takes metoprolol tart 25 as needed and she is on Xarelto 20 mg a day.  She has had no bleeding bruising or falls.  She denies chest pain or palpitations.  No shortness of breath.    The following portions of the patient's history were reviewed and updated as appropriate: allergies, current medications, past family history, past medical history, past social history, past surgical history and problem list.    Review of Systems   Constitutional: Negative for activity change, fatigue, unexpected weight gain and unexpected weight loss.   HENT: Negative for congestion and postnasal drip.    Eyes: Negative for blurred vision and double vision.   Respiratory: Negative for cough, chest tightness, shortness of breath and wheezing.    Cardiovascular: Negative for chest pain, palpitations and leg swelling.   Gastrointestinal: Negative for abdominal pain, constipation, diarrhea and GERD.   Endocrine: Negative for cold intolerance, heat intolerance, polydipsia, polyphagia and polyuria.   Genitourinary: Negative for dysuria and frequency.   Musculoskeletal: Negative for arthralgias.   Neurological: Negative for dizziness.   Hematological: Does not bruise/bleed easily.   Psychiatric/Behavioral: Positive for stress. Negative for depressed mood. The  patient is nervous/anxious.        Objective   Physical Exam  Vitals reviewed.   Constitutional:       Appearance: Normal appearance. She is normal weight.   HENT:      Head: Normocephalic.      Right Ear: Tympanic membrane normal.      Left Ear: Tympanic membrane normal.      Nose: Nose normal.      Mouth/Throat:      Mouth: Mucous membranes are moist.   Eyes:      Pupils: Pupils are equal, round, and reactive to light.   Cardiovascular:      Rate and Rhythm: Regular rhythm. Bradycardia present.      Pulses: Normal pulses.      Heart sounds: Normal heart sounds.   Pulmonary:      Effort: Pulmonary effort is normal.      Breath sounds: Normal breath sounds.   Abdominal:      General: Abdomen is flat. Bowel sounds are normal.      Palpations: Abdomen is soft.   Musculoskeletal:         General: Normal range of motion.      Cervical back: Normal range of motion.   Skin:     General: Skin is warm.   Neurological:      General: No focal deficit present.      Mental Status: She is alert.   Psychiatric:         Mood and Affect: Mood is anxious.         Vitals:    02/21/23 1056   BP: 112/82   Pulse: 57   Resp: 18   SpO2: 96%     Body mass index is 23.05 kg/m².    Procedures    Assessment & Plan   Problems Addressed this Visit        Cardiac and Vasculature    Atrial fibrillation (HCC) - Primary    Relevant Orders    Lipid Panel With / Chol / HDL Ratio    Comprehensive Metabolic Panel    TSH    CBC & Differential    Ferritin   Other Visit Diagnoses     Anxiety        Relevant Orders    Comprehensive Metabolic Panel    TSH    History of iron deficiency        Relevant Orders    CBC & Differential    Ferritin    Screening, lipid        Relevant Orders    Lipid Panel With / Chol / HDL Ratio    Osteopenia, unspecified location        Relevant Orders    DEXA Bone Density Axial    Breast cancer screening by mammogram        Relevant Orders    Mammo Screening Digital Tomosynthesis Bilateral With CAD    Postmenopausal        Other  specified disorders of bone density and structure, other site        Relevant Orders    DEXA Bone Density Axial    Abnormal finding of blood chemistry, unspecified        Relevant Orders    Lipid Panel With / Chol / HDL Ratio      Diagnoses       Codes Comments    Paroxysmal atrial fibrillation (HCC)    -  Primary ICD-10-CM: I48.0  ICD-9-CM: 427.31     Anxiety     ICD-10-CM: F41.9  ICD-9-CM: 300.00     History of iron deficiency     ICD-10-CM: Z86.39  ICD-9-CM: V12.3     Screening, lipid     ICD-10-CM: Z13.220  ICD-9-CM: V77.91     Osteopenia, unspecified location     ICD-10-CM: M85.80  ICD-9-CM: 733.90     Breast cancer screening by mammogram     ICD-10-CM: Z12.31  ICD-9-CM: V76.12     Postmenopausal     ICD-10-CM: Z78.0  ICD-9-CM: V49.81     Other specified disorders of bone density and structure, other site     ICD-10-CM: M85.88  ICD-9-CM: 733.99     Abnormal finding of blood chemistry, unspecified     ICD-10-CM: R79.9  ICD-9-CM: 790.6         [id number] with Xarelto 20 mg daily, reviewed falls risks and risk of blood thinner.  Continue with metoprolol as needed    Anxiety-encourage therapy, group support, walk daily, try guided meditation, patient will try half metoprolol 12.5 daily to see if this helps with anxiety.  Reviewed risks of bradycardia.  Patient to call if heart rate consistently below 50 or weakness/syncope  Encourage heart healthy diet, can try over-the-counter magnesium 500 mg daily to help with sleep and stress  Drink plenty of water    Order updated mammogram and DEXA scan.  Patient to return for Medicare wellness           Education provided in [facility]   No follow-ups on file.

## 2023-02-22 ENCOUNTER — TELEPHONE (OUTPATIENT)
Dept: GASTROENTEROLOGY | Facility: CLINIC | Age: 68
End: 2023-02-22

## 2023-02-22 LAB
ALBUMIN SERPL-MCNC: 4.2 G/DL (ref 3.5–5.2)
ALBUMIN/GLOB SERPL: 2 G/DL
ALP SERPL-CCNC: 41 U/L (ref 39–117)
ALT SERPL-CCNC: 13 U/L (ref 1–33)
AST SERPL-CCNC: 21 U/L (ref 1–32)
BASOPHILS # BLD AUTO: 0.04 10*3/MM3 (ref 0–0.2)
BASOPHILS NFR BLD AUTO: 0.8 % (ref 0–1.5)
BILIRUB SERPL-MCNC: 0.4 MG/DL (ref 0–1.2)
BUN SERPL-MCNC: 15 MG/DL (ref 8–23)
BUN/CREAT SERPL: 21.1 (ref 7–25)
CALCIUM SERPL-MCNC: 9 MG/DL (ref 8.6–10.5)
CHLORIDE SERPL-SCNC: 102 MMOL/L (ref 98–107)
CHOLEST SERPL-MCNC: 190 MG/DL (ref 0–200)
CHOLEST/HDLC SERPL: 1.9 {RATIO}
CO2 SERPL-SCNC: 29.4 MMOL/L (ref 22–29)
CREAT SERPL-MCNC: 0.71 MG/DL (ref 0.57–1)
EGFRCR SERPLBLD CKD-EPI 2021: 91.6 ML/MIN/1.73
EOSINOPHIL # BLD AUTO: 0.08 10*3/MM3 (ref 0–0.4)
EOSINOPHIL NFR BLD AUTO: 1.6 % (ref 0.3–6.2)
ERYTHROCYTE [DISTWIDTH] IN BLOOD BY AUTOMATED COUNT: 12.7 % (ref 12.3–15.4)
FERRITIN SERPL-MCNC: 22.1 NG/ML (ref 13–150)
GLOBULIN SER CALC-MCNC: 2.1 GM/DL
GLUCOSE SERPL-MCNC: 82 MG/DL (ref 65–99)
HCT VFR BLD AUTO: 41 % (ref 34–46.6)
HDLC SERPL-MCNC: 100 MG/DL (ref 40–60)
HGB BLD-MCNC: 13.5 G/DL (ref 12–15.9)
IMM GRANULOCYTES # BLD AUTO: 0 10*3/MM3 (ref 0–0.05)
IMM GRANULOCYTES NFR BLD AUTO: 0 % (ref 0–0.5)
LDLC SERPL CALC-MCNC: 83 MG/DL (ref 0–100)
LYMPHOCYTES # BLD AUTO: 1.01 10*3/MM3 (ref 0.7–3.1)
LYMPHOCYTES NFR BLD AUTO: 20.8 % (ref 19.6–45.3)
MCH RBC QN AUTO: 29.4 PG (ref 26.6–33)
MCHC RBC AUTO-ENTMCNC: 32.9 G/DL (ref 31.5–35.7)
MCV RBC AUTO: 89.3 FL (ref 79–97)
MONOCYTES # BLD AUTO: 0.44 10*3/MM3 (ref 0.1–0.9)
MONOCYTES NFR BLD AUTO: 9.1 % (ref 5–12)
NEUTROPHILS # BLD AUTO: 3.28 10*3/MM3 (ref 1.7–7)
NEUTROPHILS NFR BLD AUTO: 67.7 % (ref 42.7–76)
NRBC BLD AUTO-RTO: 0 /100 WBC (ref 0–0.2)
PLATELET # BLD AUTO: 228 10*3/MM3 (ref 140–450)
POTASSIUM SERPL-SCNC: 4.8 MMOL/L (ref 3.5–5.2)
PROT SERPL-MCNC: 6.3 G/DL (ref 6–8.5)
RBC # BLD AUTO: 4.59 10*6/MM3 (ref 3.77–5.28)
SODIUM SERPL-SCNC: 141 MMOL/L (ref 136–145)
TRIGL SERPL-MCNC: 32 MG/DL (ref 0–150)
TSH SERPL DL<=0.005 MIU/L-ACNC: 0.67 UIU/ML (ref 0.27–4.2)
VLDLC SERPL CALC-MCNC: 7 MG/DL (ref 5–40)
WBC # BLD AUTO: 4.85 10*3/MM3 (ref 3.4–10.8)

## 2023-02-22 NOTE — TELEPHONE ENCOUNTER
Caller: Jackie Deleon    Relationship to patient: Self    Best call back number: [phone]  CAN CALL ANYTIME BEFORE 9AM AND AFTER 11AM TOMORROW.     Patient is needing: TO SCHEDULE COLONOSCOPY.

## 2023-03-03 NOTE — TELEPHONE ENCOUNTER
Caller: Jackie Deleon    Relationship to patient: Self    Best call back number: [phone]    Patient is needing: PT NEVER RECEIVED A CALL BACK ABOUT SCHED COLONOSCOPY. SHE WAS CHECKING ON THIS, AND WANTED SOMEONE TO CALL HER ASAP.

## 2023-03-17 ENCOUNTER — TELEPHONE (OUTPATIENT)
Dept: FAMILY MEDICINE CLINIC | Facility: CLINIC | Age: 68
End: 2023-03-17

## 2023-03-17 NOTE — TELEPHONE ENCOUNTER
Caller: Jackie Deleon    Relationship to patient: Self    Patient is needing:PATIENT WOULD LIKE A CALL BACK TO ADVISE IF THE ORDERS HAVE BEEN PLACED FOR HER TO GET A DEXA SCAN AND MAMMOGRAM.  PLEASE CALL THE PATIENT  [phone]

## 2023-04-03 ENCOUNTER — TELEPHONE (OUTPATIENT)
Dept: GASTROENTEROLOGY | Facility: CLINIC | Age: 68
End: 2023-04-03
Payer: MEDICARE

## 2023-04-03 PROBLEM — Z12.12 SCREENING FOR COLORECTAL CANCER: Status: ACTIVE | Noted: 2023-04-03

## 2023-04-03 PROBLEM — Z12.11 SCREENING FOR COLORECTAL CANCER: Status: ACTIVE | Noted: 2023-04-03

## 2023-04-03 NOTE — TELEPHONE ENCOUNTER
YULI patient via telephone for. Scheduled COLONOSCOPY 8/3/2023 with arrival time of 7AM. Prep paperwork mailed to verified address on file. Patient advised arrival time may change based on [facility] guidelines. YULI DAMICO

## 2023-04-11 ENCOUNTER — OFFICE VISIT (OUTPATIENT)
Dept: FAMILY MEDICINE CLINIC | Facility: CLINIC | Age: 68
End: 2023-04-11
Payer: MEDICARE

## 2023-04-11 VITALS
DIASTOLIC BLOOD PRESSURE: 82 MMHG | OXYGEN SATURATION: 98 % | BODY MASS INDEX: 23.19 KG/M2 | WEIGHT: 126 LBS | HEART RATE: 65 BPM | SYSTOLIC BLOOD PRESSURE: 130 MMHG | RESPIRATION RATE: 18 BRPM | HEIGHT: 62 IN

## 2023-04-11 DIAGNOSIS — Z23 NEED FOR VACCINATION: Primary | ICD-10-CM

## 2023-04-11 NOTE — PROGRESS NOTES
The ABCs of the Annual Wellness Visit  Subsequent Medicare Wellness Visit    Subjective      Jackie Deleon is a 68 y.o. female who presents for a Subsequent Medicare Wellness Visit.    The following portions of the patient's history were reviewed and   updated as appropriate: allergies, current medications, past family history, past medical history, past social history, past surgical history and problem list.    Compared to one year ago, the patient feels her physical   health is the same.    Compared to one year ago, the patient feels her mental   health is better.    Recent Hospitalizations:  She was not admitted to the hospital during the last year.       Current Medical Providers:  Patient Care Team:  Grazyna Russo APRN as PCP - General (Family Medicine)  Pancho Jennings MD as Consulting Physician (Allergy and Immunology)    Outpatient Medications Prior to Visit   Medication Sig Dispense Refill   • Bioflavonoid Products (Sera-C) 500-550 MG tablet      • Calcium-Vitamin D-Vitamin K 500-500-40 MG-UNT-MCG chewable tablet Chew.     • Cobalamin Combinations (B-12) 100-5000 MCG sublingual tablet      • glucosamine-chondroitin 500-400 MG capsule capsule Take  by mouth 3 (three) times a day with meals.     • Magnesium 100 MG capsule      • metoprolol tartrate (LOPRESSOR) 25 MG tablet Take 1 tablet by mouth As Needed (for a fib episode.  Hold for top BP # below 100 or HR below 60). 90 tablet 1   • Omega-3 1000 MG capsule      • rivaroxaban (Xarelto) 20 MG tablet Take 1 tablet by mouth Daily With Dinner. 90 tablet 1   • Turmeric 500 MG tablet Take  by mouth. 1,000       No facility-administered medications prior to visit.       No opioid medication identified on active medication list. I have reviewed chart for other potential  high risk medication/s and harmful drug interactions in the elderly.          Aspirin is not on active medication list.  Aspirin use is not indicated based on review of current medical  "condition/s. Risk of harm outweighs potential benefits.  .    Patient Active Problem List   Diagnosis   • Osteoporosis   • Atrial fibrillation, persistent   • Paroxysmal atrial fibrillation   • Chronic anticoagulation   • Atrial fibrillation   • Osteoporosis   • Screening for colorectal cancer     Advance Care Planning   Advance Care Planning     Advance Directive is on file.  ACP discussion was held with the patient during this visit. Patient has an advance directive in EMR which is still valid.      Objective    Vitals:    23 0951   BP: 130/82   Pulse: 65   Resp: 18   SpO2: 98%   Weight: 57.2 kg (126 lb)   Height: 157.5 cm (62\")     Estimated body mass index is 23.05 kg/m² as calculated from the following:    Height as of this encounter: 157.5 cm (62\").    Weight as of this encounter: 57.2 kg (126 lb).    BMI is within normal parameters. No other follow-up for BMI required.      Does the patient have evidence of cognitive impairment?   No    Lab Results   Component Value Date    CHLPL 190 2023    TRIG 32 2023     (H) 2023    LDL 83 2023    VLDL 7 2023          HEALTH RISK ASSESSMENT    Smoking Status:  Social History     Tobacco Use   Smoking Status Never   Smokeless Tobacco Never   Tobacco Comments    caffeine use     Alcohol Consumption:  Social History     Substance and Sexual Activity   Alcohol Use Yes   • Alcohol/week: 3.0 - 5.0 standard drinks   • Types: 3 - 5 Glasses of wine per week    Comment: 5-7/week     Fall Risk Screen:    [place] Fall Risk Assessment was completed, and patient is at LOW risk for falls.Assessment completed on:2023    Depression Screenin/11/2023     9:51 AM   PHQ-2/PHQ-9 Depression Screening   Little Interest or Pleasure in Doing Things 0-->not at all   Feeling Down, Depressed or Hopeless 0-->not at all   PHQ-9: Brief Depression Severity Measure Score 0       Health Habits and Functional and Cognitive Screenin/11/2023     " 9:54 AM   Functional & Cognitive Status   Do you have difficulty preparing food and eating? No   Do you have difficulty bathing yourself, getting dressed or grooming yourself? No   Do you have difficulty using the toilet? No   Do you have difficulty moving around from place to place? No   Do you have trouble with steps or getting out of a bed or a chair? No   Current Diet Well Balanced Diet   Dental Exam Up to date   Eye Exam Up to date   Exercise (times per week) 5 times per week   Current Exercises Include Walking   Do you need help using the phone?  No   Are you deaf or do you have serious difficulty hearing?  No   Do you need help with transportation? No   Do you need help shopping? No   Do you need help preparing meals?  No   Do you need help with housework?  No   Do you need help with laundry? No   Do you need help taking your medications? No   Do you need help managing money? No   Do you ever drive or ride in a car without wearing a seat belt? No   Have you felt unusual stress, anger or loneliness in the last month? No   Who do you live with? Spouse   If you need help, do you have trouble finding someone available to you? No   Have you been bothered in the last four weeks by sexual problems? No   Do you have difficulty concentrating, remembering or making decisions? No       Age-appropriate Screening Schedule:  Refer to the list below for future screening recommendations based on patient's age, sex and/or medical conditions. Orders for these recommended tests are listed in the plan section. The patient has been provided with a written plan.    Health Maintenance   Topic Date Due   • DXA SCAN  03/24/2022   • MAMMOGRAM  04/05/2023   • INFLUENZA VACCINE  08/01/2023   • COLORECTAL CANCER SCREENING  09/10/2023   • ANNUAL WELLNESS VISIT  04/11/2024   • TDAP/TD VACCINES (3 - Td or Tdap) 03/04/2032   • HEPATITIS C SCREENING  Completed   • COVID-19 Vaccine  Completed   • Pneumococcal Vaccine 65+  Completed   • ZOSTER  VACCINE  Completed   • PAP SMEAR  Discontinued                  CMS Preventative Services Quick Reference  Risk Factors Identified During Encounter:    Fall Risk-High or Moderate: Discussed Fall Prevention in the home  Immunizations Discussed/Encouraged: Prevnar 20 (Pneumococcal 20-valent conjugate)  Dental Screening Recommended  Vision Screening Recommended    The above risks/problems have been discussed with the patient.  Pertinent information has been shared with the patient in the After Visit Summary.    Diagnoses and all orders for this visit:    1. Need for vaccination (Primary)  -     Pneumococcal Conjugate Vaccine 20-Valent (PCV20)        Follow Up:   Next Medicare Wellness visit to be scheduled in 1 year.      An After Visit Summary and PPPS were made available to the patient.

## 2023-05-15 RX ORDER — AMIODARONE HYDROCHLORIDE 200 MG/1
TABLET ORAL
Qty: 90 TABLET | Refills: 3 | OUTPATIENT
Start: 2023-05-15

## 2023-06-16 ENCOUNTER — OFFICE VISIT (OUTPATIENT)
Dept: CARDIOLOGY | Facility: CLINIC | Age: 68
End: 2023-06-16
Payer: MEDICARE

## 2023-06-16 VITALS
HEIGHT: 62 IN | HEART RATE: 65 BPM | DIASTOLIC BLOOD PRESSURE: 76 MMHG | SYSTOLIC BLOOD PRESSURE: 124 MMHG | BODY MASS INDEX: 22.89 KG/M2 | OXYGEN SATURATION: 97 % | WEIGHT: 124.4 LBS

## 2023-06-16 DIAGNOSIS — I48.0 PAROXYSMAL ATRIAL FIBRILLATION: Primary | ICD-10-CM

## 2023-06-16 DIAGNOSIS — G47.33 OSA (OBSTRUCTIVE SLEEP APNEA): ICD-10-CM

## 2023-06-16 PROCEDURE — 99214 OFFICE O/P EST MOD 30 MIN: CPT | Performed by: INTERNAL MEDICINE

## 2023-06-16 PROCEDURE — 93000 ELECTROCARDIOGRAM COMPLETE: CPT | Performed by: INTERNAL MEDICINE

## 2023-06-16 RX ORDER — DIGOXIN 125 MCG
125 TABLET ORAL DAILY
Qty: 90 TABLET | Refills: 3 | Status: SHIPPED | OUTPATIENT
Start: 2023-06-16

## 2023-06-16 NOTE — PROGRESS NOTES
PATIENTINFORMATION    Date of Office Visit: 2023  Encounter Provider: Erich Samuel MD  Place of Service: [facility] CARDIOLOGY  Patient Name: Jackie Deleon  : 1955    Subjective:     Encounter Date:2023      Patient ID: Jackie Deleon is a 68 y.o. female.    No chief complaint on file.    HPI  Ms. Deleon is a pleasant 68 years old lady who came to cardiology clinic for follow-up visit.  Amiodarone discontinued during prior visit because of concerns of side effects by patient.  She could barely tolerate metoprolol because of hypotension.  She continues to have more frequent paroxysmal atrial fibrillation lasting for several hours with some palpitations.  She reports still carrying out her activities including exercise and walking even during A-fib episodes.  Taking metoprolol will drop her blood pressure and worsening symptoms.  She denies any presyncope or syncope, chest pain, significant change in breathing, orthopnea or extremity swelling.    She is very compliant with Xarelto.  She is not sure if she has symptoms of sleep apnea.      ROS  All systems reviewed and negative except as noted in HPI.    Past Medical History:   Diagnosis Date   • Abnormal ECG 22   • Anxiety    • Atrial fibrillation 22   • Colon polyp     Benign   • Coronary artery disease     A-Fib   • Osteopenia    • Osteoporosis        Past Surgical History:   Procedure Laterality Date   • ADENOIDECTOMY  child   • COLONOSCOPY     • D & C AND LAPAROSCOPY     • EYE SURGERY  2009    lens implants   • TONSILLECTOMY         Social History     Socioeconomic History   • Marital status:    • Number of children: 2   Tobacco Use   • Smoking status: Never   • Smokeless tobacco: Never   • Tobacco comments:     caffeine use   Vaping Use   • Vaping Use: Never used   Substance and Sexual Activity   • Alcohol use: Yes     Alcohol/week: 3.0 - 5.0 standard drinks     Types: 3 - 5 Glasses of wine  "per week     Comment: 5-7/week   • Drug use: No   • Sexual activity: Not Currently     Partners: Male     Birth control/protection: None       Family History   Problem Relation Age of Onset   • Hypertension Mother    • Multiple myeloma Mother    • Arthritis Mother    • Cancer Mother    • COPD Mother    • Depression Mother    • Hypertension Father    • Prostate cancer Father    • Arthritis Father    • Cancer Father    • COPD Father    • Heart disease Maternal Grandfather    • Stroke Maternal Grandfather    • Breast cancer Paternal Aunt            ECG 12 Lead    Date/Time: 6/16/2023 12:47 PM  Performed by: Erich Samuel MD  Authorized by: Erich Samuel MD   Comparison: compared with previous ECG from 12/5/2022  Comparison to previous ECG: A-fib is new since prior tracing.  Rhythm: atrial fibrillation  Rate: normal  Conduction: conduction normal  ST Segments: ST segments normal  T Waves: T waves normal  QRS axis: normal  Other: no other findings    Clinical impression: abnormal EKG               Objective:     /76   Pulse 65   Ht 157.5 cm (62\")   Wt 56.4 kg (124 lb 6.4 oz)   SpO2 97%   BMI 22.75 kg/m²  Body mass index is 22.75 kg/m².     Constitutional:       General: Not in acute distress.     Appearance: Well-developed. Not diaphoretic.   Eyes:      Pupils: Pupils are equal, round, and reactive to light.   HENT:      Head: Normocephalic and atraumatic.   Neck:      Thyroid: No thyromegaly.   Pulmonary:      Effort: Pulmonary effort is normal. No respiratory distress.      Breath sounds: Normal breath sounds. No wheezing. No rales.   Chest:      Chest wall: Not tender to palpatation.   Cardiovascular:      Normal rate. Irregularly irregular rhythm.      No gallop.   Pulses:     Intact distal pulses.   Edema:     Peripheral edema absent.   Abdominal:      General: Bowel sounds are normal. There is no distension.      Palpations: Abdomen is soft.      Tenderness: There is no guarding. "   Musculoskeletal: Normal range of motion.         General: No deformity.      Cervical back: Normal range of motion and neck supple. Skin:     General: Skin is warm and dry.      Findings: No rash.   Neurological:      Mental Status: Alert and oriented to person, place, and time.      Cranial Nerves: No cranial nerve deficit.      Deep Tendon Reflexes: Reflexes are normal and symmetric.   Psychiatric:         Judgment: Judgment normal.         Review Of Data: I have reviewed pertinent recent labs, images and documents and pertinent findings included in HPI or assessment below.    Lipid Panel        2/21/2023    11:30   Lipid Panel   Total Cholesterol 190    Triglycerides 32    HDL Cholesterol 100    VLDL Cholesterol 7    LDL Cholesterol  83          Assessment/Plan:         1.  Paroxysmal atrial fibrillation-converted to sinus  after she was started on amiodarone and planned DCCV canceled in the past .[id number] score of 2: Age and gender.  Amiodarone later discontinued because of side effects concerns.  Increased frequency of paroxysmal atrial fibrillation with palpitations as main symptom.  Unable to tolerate beta-blocker because of low blood pressure.  She is very compliant with Xarelto  2. Valvular heart disease: Improved per repeat echo in January 2023 from moderate to trace to mild MR.  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 History of mild cardiomyopathy likely tachycardia induced-ventricular ejection fraction of 46-50% based on echo in April 2022 .  LVEF returned to normal in January 2023 echocardiogram.  Improved MR with trace to mild MR/TR.  4. Mild thoracic aortic aneurysm: Ascending aorta measuring 3.8 cm-stable  5. Normal treadmill test in June 2022    She was in atrial fibrillation during exam today with heart rate of 83 bpm.  She took metoprolol this morning.  More frequent atrial fibrillation episodes.  After discussing treatment options she is interested in ablation.  I have talked to  PRADEEP who will see her in office to discuss treatment options.  Start digoxin in the meantime.    Diagnosis and plan of care discussed with patient and verbalized understanding.            Your medication list          Accurate as of June 16, 2023 12:45 PM. If you have any questions, ask your nurse or doctor.            START taking these medications      Instructions Last Dose Given Next Dose Due   digoxin 125 MCG tablet  Commonly known as: LANOXIN  Started by: Erich Samuel MD      Take 1 tablet by mouth Daily.          CONTINUE taking these medications      Instructions Last Dose Given Next Dose Due   B-12 100-5000 MCG sublingual tablet           Calcium-Vitamin D-Vitamin K 500-500-40 MG-UNT-MCG chewable tablet      Chew.       Sera-C 500-550 MG tablet           glucosamine-chondroitin 500-400 MG capsule capsule      Take  by mouth 3 (three) times a day with meals.       Magnesium 100 MG capsule           metoprolol tartrate 25 MG tablet  Commonly known as: LOPRESSOR      Take 1 tablet by mouth As Needed (for a fib episode.  Hold for top BP # below 100 or HR below 60).       Omega-3 1000 MG capsule           rivaroxaban 20 MG tablet  Commonly known as: Xarelto      Take 1 tablet by mouth Daily With Dinner.       Turmeric 500 MG tablet      Take  by mouth. 1,000             Where to Get Your Medications      These medications were sent to [facility]/pharmacy #[id number] - [place], [place] - [address] [facility] - [phone]  - [phone] FX  [address] [address]    Phone: [phone]   · digoxin 125 MCG tablet             Erich Samuel MD  06/16/23  12:45 EDT

## 2023-06-19 ENCOUNTER — TELEPHONE (OUTPATIENT)
Dept: CARDIOLOGY | Facility: CLINIC | Age: 68
End: 2023-06-19

## 2023-06-19 NOTE — TELEPHONE ENCOUNTER
Caller: Jackie Deleon    Relationship: Self    Best call back number: [phone]    What is the best time to reach you: ANY    Who are you requesting to speak with (clinical staff, provider,  specific staff member): CLINICAL        What was the call regarding: PATIENT STATED THAT DR. HAAS WANTED HER TO HAVE SLEEP STUDY DONE BEFORE SHE SEE'S HIM ON 06.21.23 AND SHE HAS NOT HEARD ANYTHING TO SCHEDULE THE SLEEP STUDY.     Is it okay if the provider responds through [facility]: NO, PLEASE CALL PATIENT.

## 2023-06-19 NOTE — TELEPHONE ENCOUNTER
Pt is wondering if she needs to have the sleep study prior to seeing JM?  Please advise.    KRISTIAN VALENCIA

## 2023-06-21 NOTE — H&P (VIEW-ONLY)
Date of Office Visit: 2023  Encounter Provider: Juwan Agustin MD  Place of Service: [facility] CARDIOLOGY  Patient Name: Jackie Deleon  : 1955    Subjective:     Encounter Date:2023      Patient ID: Jackie Deleon is a 68 y.o. female who has a cc of  PAF and she was on amio and is now off for 4 months.     She has freq episodes -- about every few days--lasting hours. Last one lasted over 24 hours.     On amio she had minimal episodes. But she did not want to be on amio.     AF started 2022.     She was going to CV and converted.     She has low energy and fatigue, and def a worse QOl.     Echo in Jam -- normal EF.       When not in AF   The patient had a good year.   No anginal chest pain,   No sig manuel,   No soa,   No fainting,  No orthostasis.   No edema.   Exercise tolerance: good.     There have been no hospital admission since the last visit.     There have been no bleeding events.       Past Medical History:   Diagnosis Date    Abnormal ECG 22    Anxiety     Atrial fibrillation 22    Colon polyp     Benign    Coronary artery disease     A-Fib    Osteopenia     Osteoporosis        Social History     Socioeconomic History    Marital status:     Number of children: 2   Tobacco Use    Smoking status: Never    Smokeless tobacco: Never    Tobacco comments:     caffeine use   Vaping Use    Vaping Use: Never used   Substance and Sexual Activity    Alcohol use: Yes     Alcohol/week: 3.0 - 5.0 standard drinks     Types: 3 - 5 Glasses of wine per week     Comment: 5-7/week    Drug use: No    Sexual activity: Not Currently     Partners: Male     Birth control/protection: None       Family History   Problem Relation Age of Onset    Hypertension Mother     Multiple myeloma Mother     Arthritis Mother     Cancer Mother     COPD Mother     Depression Mother     Hypertension Father     Prostate cancer Father     Arthritis Father     Cancer Father     COPD  "Father     Heart disease Maternal Grandfather     Stroke Maternal Grandfather     Breast cancer Paternal Aunt        Review of Systems   Constitutional: Negative for fever and night sweats.   HENT:  Negative for ear pain and stridor.    Eyes:  Negative for discharge and visual halos.   Cardiovascular:  Negative for cyanosis.   Respiratory:  Negative for hemoptysis and sputum production.    Hematologic/Lymphatic: Negative for adenopathy.   Skin:  Negative for nail changes and unusual hair distribution.   Musculoskeletal:  Negative for gout and joint swelling.   Gastrointestinal:  Negative for bowel incontinence and flatus.   Genitourinary:  Negative for dysuria and flank pain.   Neurological:  Negative for seizures and tremors.   Psychiatric/Behavioral:  Negative for altered mental status. The patient is not nervous/anxious.           Objective:     Vitals:    06/21/23 1256   BP: 130/76   Pulse: 72   Weight: 57.2 kg (126 lb)   Height: 157.5 cm (62\")         Eyes:      General:         Right eye: No discharge.         Left eye: No discharge.   HENT:      Head: Normocephalic and atraumatic.   Neck:      Thyroid: No thyromegaly.      Vascular: No JVD.   Pulmonary:      Effort: Pulmonary effort is normal.      Breath sounds: Normal breath sounds. No rales.   Cardiovascular:      Normal rate. Regular rhythm.      No gallop.    Edema:     Peripheral edema absent.   Abdominal:      General: Bowel sounds are normal.      Palpations: Abdomen is soft.      Tenderness: There is no abdominal tenderness.   Musculoskeletal: Normal range of motion.         General: No deformity. Skin:     General: Skin is warm and dry.      Findings: No erythema.   Neurological:      Mental Status: Alert and oriented to person, place, and time.      Motor: Normal muscle tone.   Psychiatric:         Behavior: Behavior normal.         Thought Content: Thought content normal.           ECG 12 Lead    Date/Time: 6/21/2023 1:25 PM  Performed by: " Juwan Agustin MD  Authorized by: Juwan Agustin MD   Comparison: compared with previous ECG   Similar to previous ECG  Rhythm: sinus rhythm  Other findings: non-specific ST-T wave changes        Lab Review:       Assessment:          Diagnosis Plan   1. Atrial fibrillation, persistent        2. Paroxysmal atrial fibrillation               Plan:     She has very symptomatic AF and has tried AAD and has no other correctable risk factors and we disc PVI and AF ablation and she is agreeable.    Risks discussed

## 2023-06-22 ENCOUNTER — TELEPHONE (OUTPATIENT)
Dept: GASTROENTEROLOGY | Facility: CLINIC | Age: 68
End: 2023-06-22

## 2023-06-22 NOTE — TELEPHONE ENCOUNTER
Caller: Jackie Deleon    Relationship to patient: Self    Best call back number:[phone]    Patient is needing: UNABLE TO WARM TRANSFER CALL. PATIENT CALL TO CANCEL AND RESCHEDULE THE COLONOSCOPY IN AUGUST. PLEASE CONTACT THE PATIENT AT THE NUMBER ABOVE, ANYTIME IS OKAY TO CALL.

## 2023-07-20 ENCOUNTER — HOSPITAL ENCOUNTER (OUTPATIENT)
Facility: HOSPITAL | Age: 68
Setting detail: HOSPITAL OUTPATIENT SURGERY
Discharge: HOME | End: 2023-07-20
Attending: INTERNAL MEDICINE | Admitting: INTERNAL MEDICINE
Payer: MEDICARE

## 2023-07-20 VITALS
TEMPERATURE: 98.3 F | DIASTOLIC BLOOD PRESSURE: 71 MMHG | HEART RATE: 73 BPM | WEIGHT: 122 LBS | SYSTOLIC BLOOD PRESSURE: 114 MMHG | OXYGEN SATURATION: 100 % | HEIGHT: 66 IN | RESPIRATION RATE: 16 BRPM | BODY MASS INDEX: 19.61 KG/M2

## 2023-07-20 DIAGNOSIS — I48.19 ATRIAL FIBRILLATION, PERSISTENT: ICD-10-CM

## 2023-07-20 DIAGNOSIS — I48.0 PAROXYSMAL ATRIAL FIBRILLATION: ICD-10-CM

## 2023-07-20 PROCEDURE — 93656 COMPRE EP EVAL ABLTJ ATR FIB: CPT | Performed by: INTERNAL MEDICINE

## 2023-07-20 PROCEDURE — 93010 ELECTROCARDIOGRAM REPORT: CPT | Performed by: INTERNAL MEDICINE

## 2023-07-20 PROCEDURE — 25010000002 MIDAZOLAM PER 1 MG: Performed by: ANESTHESIOLOGY

## 2023-07-20 PROCEDURE — C1732 CATH, EP, DIAG/ABL, 3D/VECT: HCPCS | Performed by: INTERNAL MEDICINE

## 2023-07-20 PROCEDURE — 93005 ELECTROCARDIOGRAM TRACING: CPT | Performed by: INTERNAL MEDICINE

## 2023-07-20 PROCEDURE — C1759 CATH, INTRA ECHOCARDIOGRAPHY: HCPCS | Performed by: INTERNAL MEDICINE

## 2023-07-20 PROCEDURE — 25010000002 PROTAMINE SULFATE PER 10 MG: Performed by: INTERNAL MEDICINE

## 2023-07-20 PROCEDURE — C1893 INTRO/SHEATH, FIXED,NON-PEEL: HCPCS | Performed by: INTERNAL MEDICINE

## 2023-07-20 PROCEDURE — 25010000002 HEPARIN (PORCINE) PER 1000 UNITS: Performed by: INTERNAL MEDICINE

## 2023-07-20 PROCEDURE — 85347 COAGULATION TIME ACTIVATED: CPT

## 2023-07-20 PROCEDURE — C1730 CATH, EP, 19 OR FEW ELECT: HCPCS | Performed by: INTERNAL MEDICINE

## 2023-07-20 PROCEDURE — C1894 INTRO/SHEATH, NON-LASER: HCPCS | Performed by: INTERNAL MEDICINE

## 2023-07-20 RX ORDER — IPRATROPIUM BROMIDE AND ALBUTEROL SULFATE 2.5; .5 MG/3ML; MG/3ML
3 SOLUTION RESPIRATORY (INHALATION) ONCE AS NEEDED
Status: DISCONTINUED | OUTPATIENT
Start: 2023-07-20 | End: 2023-07-20 | Stop reason: HOSPADM

## 2023-07-20 RX ORDER — EPHEDRINE SULFATE 50 MG/ML
5 INJECTION, SOLUTION INTRAVENOUS ONCE AS NEEDED
Status: DISCONTINUED | OUTPATIENT
Start: 2023-07-20 | End: 2023-07-20 | Stop reason: HOSPADM

## 2023-07-20 RX ORDER — FLUMAZENIL 0.1 MG/ML
0.2 INJECTION INTRAVENOUS AS NEEDED
Status: DISCONTINUED | OUTPATIENT
Start: 2023-07-20 | End: 2023-07-20 | Stop reason: HOSPADM

## 2023-07-20 RX ORDER — FENTANYL CITRATE 50 UG/ML
50 INJECTION, SOLUTION INTRAMUSCULAR; INTRAVENOUS
Status: DISCONTINUED | OUTPATIENT
Start: 2023-07-20 | End: 2023-07-20 | Stop reason: HOSPADM

## 2023-07-20 RX ORDER — LABETALOL HYDROCHLORIDE 5 MG/ML
5 INJECTION, SOLUTION INTRAVENOUS
Status: DISCONTINUED | OUTPATIENT
Start: 2023-07-20 | End: 2023-07-20 | Stop reason: HOSPADM

## 2023-07-20 RX ORDER — SODIUM CHLORIDE 0.9 % (FLUSH) 0.9 %
10 SYRINGE (ML) INJECTION EVERY 12 HOURS SCHEDULED
Status: DISCONTINUED | OUTPATIENT
Start: 2023-07-20 | End: 2023-07-20 | Stop reason: HOSPADM

## 2023-07-20 RX ORDER — HYDROCODONE BITARTRATE AND ACETAMINOPHEN 7.5; 325 MG/1; MG/1
1 TABLET ORAL EVERY 4 HOURS PRN
Status: DISCONTINUED | OUTPATIENT
Start: 2023-07-20 | End: 2023-07-20 | Stop reason: HOSPADM

## 2023-07-20 RX ORDER — FENTANYL CITRATE 50 UG/ML
25 INJECTION, SOLUTION INTRAMUSCULAR; INTRAVENOUS
Status: DISCONTINUED | OUTPATIENT
Start: 2023-07-20 | End: 2023-07-20 | Stop reason: HOSPADM

## 2023-07-20 RX ORDER — DROPERIDOL 2.5 MG/ML
0.62 INJECTION, SOLUTION INTRAMUSCULAR; INTRAVENOUS
Status: DISCONTINUED | OUTPATIENT
Start: 2023-07-20 | End: 2023-07-20 | Stop reason: HOSPADM

## 2023-07-20 RX ORDER — SODIUM CHLORIDE 9 MG/ML
75 INJECTION, SOLUTION INTRAVENOUS CONTINUOUS
Status: DISCONTINUED | OUTPATIENT
Start: 2023-07-20 | End: 2023-07-20 | Stop reason: HOSPADM

## 2023-07-20 RX ORDER — ACETAMINOPHEN 325 MG/1
650 TABLET ORAL EVERY 4 HOURS PRN
Status: DISCONTINUED | OUTPATIENT
Start: 2023-07-20 | End: 2023-07-20 | Stop reason: HOSPADM

## 2023-07-20 RX ORDER — SODIUM CHLORIDE 9 MG/ML
40 INJECTION, SOLUTION INTRAVENOUS AS NEEDED
Status: DISCONTINUED | OUTPATIENT
Start: 2023-07-20 | End: 2023-07-20 | Stop reason: HOSPADM

## 2023-07-20 RX ORDER — SODIUM CHLORIDE 0.9 % (FLUSH) 0.9 %
10 SYRINGE (ML) INJECTION AS NEEDED
Status: DISCONTINUED | OUTPATIENT
Start: 2023-07-20 | End: 2023-07-20 | Stop reason: HOSPADM

## 2023-07-20 RX ORDER — NALOXONE HCL 0.4 MG/ML
0.2 VIAL (ML) INJECTION AS NEEDED
Status: DISCONTINUED | OUTPATIENT
Start: 2023-07-20 | End: 2023-07-20 | Stop reason: HOSPADM

## 2023-07-20 RX ORDER — ONDANSETRON 2 MG/ML
4 INJECTION INTRAMUSCULAR; INTRAVENOUS ONCE AS NEEDED
Status: DISCONTINUED | OUTPATIENT
Start: 2023-07-20 | End: 2023-07-20 | Stop reason: HOSPADM

## 2023-07-20 RX ORDER — HYDROMORPHONE HYDROCHLORIDE 1 MG/ML
0.25 INJECTION, SOLUTION INTRAMUSCULAR; INTRAVENOUS; SUBCUTANEOUS
Status: DISCONTINUED | OUTPATIENT
Start: 2023-07-20 | End: 2023-07-20 | Stop reason: HOSPADM

## 2023-07-20 RX ORDER — DIPHENHYDRAMINE HYDROCHLORIDE 50 MG/ML
12.5 INJECTION INTRAMUSCULAR; INTRAVENOUS
Status: DISCONTINUED | OUTPATIENT
Start: 2023-07-20 | End: 2023-07-20 | Stop reason: HOSPADM

## 2023-07-20 RX ORDER — SODIUM CHLORIDE 9 MG/ML
INJECTION, SOLUTION INTRAVENOUS
Status: COMPLETED | OUTPATIENT
Start: 2023-07-20 | End: 2023-07-20

## 2023-07-20 RX ORDER — HYDROCODONE BITARTRATE AND ACETAMINOPHEN 5; 325 MG/1; MG/1
1 TABLET ORAL EVERY 4 HOURS PRN
Status: DISCONTINUED | OUTPATIENT
Start: 2023-07-20 | End: 2023-07-20 | Stop reason: HOSPADM

## 2023-07-20 RX ORDER — ACETAMINOPHEN 500 MG
1000 TABLET ORAL ONCE
Status: DISCONTINUED | OUTPATIENT
Start: 2023-07-20 | End: 2023-07-20 | Stop reason: HOSPADM

## 2023-07-20 RX ORDER — FAMOTIDINE 10 MG/ML
20 INJECTION, SOLUTION INTRAVENOUS ONCE
Status: COMPLETED | OUTPATIENT
Start: 2023-07-20 | End: 2023-07-20

## 2023-07-20 RX ORDER — MIDAZOLAM HYDROCHLORIDE 1 MG/ML
0.5 INJECTION INTRAMUSCULAR; INTRAVENOUS
Status: DISCONTINUED | OUTPATIENT
Start: 2023-07-20 | End: 2023-07-20 | Stop reason: HOSPADM

## 2023-07-20 RX ORDER — HYDRALAZINE HYDROCHLORIDE 20 MG/ML
5 INJECTION INTRAMUSCULAR; INTRAVENOUS
Status: DISCONTINUED | OUTPATIENT
Start: 2023-07-20 | End: 2023-07-20 | Stop reason: HOSPADM

## 2023-07-20 RX ORDER — NITROGLYCERIN 0.4 MG/1
0.4 TABLET SUBLINGUAL
Status: DISCONTINUED | OUTPATIENT
Start: 2023-07-20 | End: 2023-07-20 | Stop reason: HOSPADM

## 2023-07-20 RX ORDER — PROMETHAZINE HYDROCHLORIDE 25 MG/1
25 SUPPOSITORY RECTAL ONCE AS NEEDED
Status: DISCONTINUED | OUTPATIENT
Start: 2023-07-20 | End: 2023-07-20 | Stop reason: HOSPADM

## 2023-07-20 RX ORDER — HYDROCODONE BITARTRATE AND ACETAMINOPHEN 5; 325 MG/1; MG/1
1 TABLET ORAL ONCE AS NEEDED
Status: DISCONTINUED | OUTPATIENT
Start: 2023-07-20 | End: 2023-07-20 | Stop reason: HOSPADM

## 2023-07-20 RX ORDER — PROMETHAZINE HYDROCHLORIDE 12.5 MG/1
25 TABLET ORAL ONCE AS NEEDED
Status: DISCONTINUED | OUTPATIENT
Start: 2023-07-20 | End: 2023-07-20 | Stop reason: HOSPADM

## 2023-07-20 RX ORDER — HEPARIN SODIUM 1000 [USP'U]/ML
INJECTION, SOLUTION INTRAVENOUS; SUBCUTANEOUS
Status: DISCONTINUED | OUTPATIENT
Start: 2023-07-20 | End: 2023-07-20 | Stop reason: HOSPADM

## 2023-07-20 RX ORDER — PROTAMINE SULFATE 10 MG/ML
INJECTION, SOLUTION INTRAVENOUS
Status: DISCONTINUED | OUTPATIENT
Start: 2023-07-20 | End: 2023-07-20 | Stop reason: HOSPADM

## 2023-07-20 RX ORDER — SODIUM CHLORIDE 0.9 % (FLUSH) 0.9 %
3-10 SYRINGE (ML) INJECTION AS NEEDED
Status: DISCONTINUED | OUTPATIENT
Start: 2023-07-20 | End: 2023-07-20 | Stop reason: HOSPADM

## 2023-07-20 RX ORDER — LIDOCAINE HYDROCHLORIDE 10 MG/ML
0.5 INJECTION, SOLUTION EPIDURAL; INFILTRATION; INTRACAUDAL; PERINEURAL ONCE AS NEEDED
Status: DISCONTINUED | OUTPATIENT
Start: 2023-07-20 | End: 2023-07-20 | Stop reason: HOSPADM

## 2023-07-20 RX ORDER — SODIUM CHLORIDE 0.9 % (FLUSH) 0.9 %
3 SYRINGE (ML) INJECTION EVERY 12 HOURS SCHEDULED
Status: DISCONTINUED | OUTPATIENT
Start: 2023-07-20 | End: 2023-07-20 | Stop reason: HOSPADM

## 2023-07-20 RX ORDER — SODIUM CHLORIDE, SODIUM LACTATE, POTASSIUM CHLORIDE, CALCIUM CHLORIDE 600; 310; 30; 20 MG/100ML; MG/100ML; MG/100ML; MG/100ML
9 INJECTION, SOLUTION INTRAVENOUS CONTINUOUS
Status: DISCONTINUED | OUTPATIENT
Start: 2023-07-20 | End: 2023-07-20 | Stop reason: HOSPADM

## 2023-07-20 RX ORDER — OMEPRAZOLE 20 MG/1
20 TABLET, DELAYED RELEASE ORAL DAILY
Qty: 30 TABLET | Refills: 0
Start: 2023-07-20 | End: 2023-07-21

## 2023-07-20 RX ADMIN — MIDAZOLAM 0.5 MG: 1 INJECTION INTRAMUSCULAR; INTRAVENOUS at 08:47

## 2023-07-20 RX ADMIN — SODIUM CHLORIDE 75 ML/HR: 9 INJECTION, SOLUTION INTRAVENOUS at 07:36

## 2023-07-20 RX ADMIN — FAMOTIDINE 20 MG: 10 INJECTION INTRAVENOUS at 08:47

## 2023-07-20 NOTE — Clinical Note
Hemostasis started on the right femoral vein. Manual pressure applied to vessel. Manual pressure was held by TAMARA PERRY. Manual pressure was held for 15 min. Hemostasis achieved successfully.

## 2023-07-20 NOTE — DISCHARGE INSTRUCTIONS
[facility]  Cardiology  [address] [place] [address]  [phone]    Coronary Ablation After Care    Refer to this sheet in the next few weeks. These instructions provide you with information on caring for yourself after your procedure. Your health care provider may also give you more specific instructions. Your treatment has been planned according to current medical practices, but problems sometimes occur. Call your health care provider if you have any problems or questions after your procedure.      What to Expect After the Procedure:  After your procedure, it is typical to have the following sensations:  Minor discomfort or tenderness and a small bump at the catheter insertion site(s). The bump(s) should usually decrease in size and tenderness within 1 to 2 weeks.  Any bruising will usually fade within 2 to 4 weeks.  Home Care Instructions:  Do not apply powder or lotion to the site.  Do not take baths, swim, or use a hot tub until your health care provider approves and the site is completely healed.  Do not bend, squat, or lift anything over 20 lb (9 kg) or as directed by your health care provider. However, we recommend lifting nothing heavier than a gallon of milk.    You may shower 24 hours after the procedure. Remove the bandage (dressing) and gently wash the site with plain soap and water. Gently pat the site dry. You may apply a band aid daily for 2 days if desired.    Inspect the site at least twice daily.  Increase your fluid intake for the next 2 days.    Limit your activity for the first 48 hours.   Avoid strenuous activity for 1 week or as advised by your physician.    Follow instructions about when you can drive or return to work as directed by your physician.    Hold direct pressure over the site when you cough, sneeze, laugh or change positions.  Do this for the next 2 days.    Call Your Doctor If:  You have drainage (other than a small amount of blood on the dressing).  You  have chills or a fever > 101.  You have redness, warmth, swelling (larger than a walnut), or pain at the insertion   You develop palpitations, chest pain or shortness of breath, feel faint, or pass out.  You develop pain, discoloration, coldness, numbness, tingling, or severe bruising in the leg that held the catheter.  You develop bleeding from any other place, such as the bowels.  You have heavy bleeding from the site.  If this happens, hold pressure on the site and call 911.        Make Sure You:  Understand these instructions.  Will watch your condition.  Will get help right away if you are not doing well or get worse.

## 2023-07-20 NOTE — Clinical Note
Hemostasis started on the left femoral vein. Manual pressure applied to vessel. Manual pressure was held by DANTE Reilly RN. Manual pressure was held for 20 min. Hemostasis achieved successfully. Closure device additional comment: Pressure held extra time d/t small hematoma

## 2023-07-20 NOTE — Clinical Note
The DP pulses are +2 bilaterally. The PT pulses are +2 bilaterally. Heels intact and elevated, cocoon warmer under patient, on 43°C.

## 2023-07-21 LAB
QT INTERVAL: 420 MS
QT INTERVAL: 493 MS

## 2023-07-24 ENCOUNTER — TELEPHONE (OUTPATIENT)
Dept: CARDIOLOGY | Facility: CLINIC | Age: 68
End: 2023-07-24
Payer: MEDICARE

## 2023-07-24 LAB
ACT BLD: 347 SECONDS (ref 82–152)
ACT BLD: 365 SECONDS (ref 82–152)

## 2023-07-24 NOTE — TELEPHONE ENCOUNTER
Caller: Jackie Deleon    Relationship to patient: Self    Best call back number: [phone]    New or established patient?  [] New  [x] Established    Date of discharge: 07.20.23    Facility discharged from: [facility]    Diagnosis/Symptoms: ABLATION    Length of stay (If applicable): 1 DAY    Specialty Only: Did you see a Psychiatric provider?    [x] Yes  [] No  If so, who? DR. ORELLANA

## 2023-08-30 ENCOUNTER — OFFICE VISIT (OUTPATIENT)
Dept: CARDIOLOGY | Facility: CLINIC | Age: 68
End: 2023-08-30
Payer: MEDICARE

## 2023-08-30 VITALS
BODY MASS INDEX: 18.96 KG/M2 | HEART RATE: 68 BPM | HEIGHT: 66 IN | SYSTOLIC BLOOD PRESSURE: 110 MMHG | DIASTOLIC BLOOD PRESSURE: 86 MMHG | WEIGHT: 118 LBS

## 2023-08-30 DIAGNOSIS — Z98.890 H/O CARDIAC RADIOFREQUENCY ABLATION: ICD-10-CM

## 2023-08-30 DIAGNOSIS — I48.19 ATRIAL FIBRILLATION, PERSISTENT: Primary | ICD-10-CM

## 2023-08-30 NOTE — PROGRESS NOTES
Date of Office Visit: 2023  Encounter Provider: ROBERTO CARLOS Tristan  Place of Service: [facility] CARDIOLOGY  Patient Name: Jackie Deleon  :1955    Chief Complaint   Patient presents with    persistent AFIB    paroxysmal AFIB    s/p ablation    :     HPI: Jackie Deleon is a 68 y.o. female who follows with Dr. Samuel--- PAF, diagnosed in 2022.  She was on amiodarone for period of time.    Frequent episodes of PAF, referred to Dr. Agustin--he had been on amiodarone in the past and was off of it when he she saw him in .  Did not want to be on it long-term.    Echo in January showed normal EF.    Recommended ablation, status post PVI in July.    Presents for postprocedure follow-up.    She is doing well. First 2 weeks had increased episodes but in the last 2 weeks, only 1 episode. Metoprolol as needed.     No chest pain, dyspnea, PND, orthopnea or edema.     Rivaroxaban for AC.    Back to walking 3-4 miles per day.      Past Medical History:   Diagnosis Date    Abnormal ECG 22    Anxiety     Atrial fibrillation 22    Colon polyp     Benign    Coronary artery disease     A-Fib    Osteopenia     Osteoporosis        Past Surgical History:   Procedure Laterality Date    ADENOIDECTOMY  child    CARDIAC ELECTROPHYSIOLOGY PROCEDURE N/A 2023    Procedure: Ablation atrial fibrillation;  Surgeon: Juwan Agustin MD;  Location: [facility] INVASIVE LOCATION;  Service: Cardiovascular;  Laterality: N/A;    COLONOSCOPY      D & C AND LAPAROSCOPY      EYE SURGERY  2009    lens implants    TONSILLECTOMY         Social History     Socioeconomic History    Marital status:     Number of children: 2   Tobacco Use    Smoking status: Never    Smokeless tobacco: Never    Tobacco comments:     caffeine use   Vaping Use    Vaping Use: Never used   Substance and Sexual Activity    Alcohol use: Yes     Alcohol/week: 3.0 - 5.0 standard drinks     Types:  3 - 5 Glasses of wine per week     Comment: 5-7/week    Drug use: No    Sexual activity: Not Currently     Partners: Male     Birth control/protection: None       Family History   Problem Relation Age of Onset    Hypertension Mother     Multiple myeloma Mother     Arthritis Mother     Cancer Mother     COPD Mother     Depression Mother     Hypertension Father     Prostate cancer Father     Arthritis Father     Cancer Father     COPD Father     Heart disease Maternal Grandfather     Stroke Maternal Grandfather     Breast cancer Paternal Aunt        Review of Systems   Constitutional: Negative for chills, fever and malaise/fatigue.   Cardiovascular:  Negative for chest pain, dyspnea on exertion, leg swelling, near-syncope, orthopnea, palpitations, paroxysmal nocturnal dyspnea and syncope.   Respiratory:  Negative for cough and shortness of breath.    Hematologic/Lymphatic: Negative.    Musculoskeletal:  Negative for joint pain, joint swelling and myalgias.   Gastrointestinal:  Negative for abdominal pain, diarrhea, melena, nausea and vomiting.   Genitourinary:  Negative for frequency and hematuria.   Neurological:  Negative for light-headedness, numbness, paresthesias and seizures.   Allergic/Immunologic: Negative.    All other systems reviewed and are negative.    No Known Allergies      Current Outpatient Medications:     Bioflavonoid Products (Sera-C) 500-550 MG tablet, , Disp: , Rfl:     Calcium-Vitamin D-Vitamin K 500-500-40 MG-UNT-MCG chewable tablet, Chew., Disp: , Rfl:     Cobalamin Combinations (B-12) 100-5000 MCG sublingual tablet, , Disp: , Rfl:     glucosamine-chondroitin 500-400 MG capsule capsule, Take  by mouth 3 (three) times a day with meals., Disp: , Rfl:     Magnesium 100 MG capsule, , Disp: , Rfl:     metoprolol tartrate (LOPRESSOR) 25 MG tablet, Take 1 tablet by mouth As Needed (for a fib episode.  Hold for top BP # below 100 or HR below 60)., Disp: 90 tablet, Rfl: 1    rivaroxaban (Xarelto) 20  "MG tablet, Take 1 tablet by mouth Daily With Dinner., Disp: 90 tablet, Rfl: 1    Turmeric 500 MG tablet, Take  by mouth. 1,000, Disp: , Rfl:       Objective:     Vitals:    08/30/23 0952   Weight: 53.5 kg (118 lb)   Height: 167.6 cm (66\")     Body mass index is 19.05 kg/mý.    PHYSICAL EXAM:    Vitals Reviewed.   General Appearance: No acute distress, well developed and well nourished.   Eyes: Conjunctiva and lids: No erythema, swelling, or discharge. Sclera non-icteric.   HENT: Atraumatic, normocephalic. External eyes, ears, and nose normal.   Respiratory: No signs of respiratory distress. Respiration rhythm and depth normal.   Clear to auscultation. No rales, crackles, rhonchi, or wheezing auscultated.   Cardiovascular:  Heart Rate and Rhythm: Normal, Heart Sounds: Normal S1 and S2. No S3 or S4 noted.  Murmurs: No murmurs noted. No rubs, thrills, or gallops.   Arterial Pulses:  Posterior tibialis and dorsalis pedis pulses normal.   Lower Extremities: No edema noted.  Gastrointestinal:  Abdomen soft, non-distended, non-tender.   Musculoskeletal: Normal movement of extremities  Skin: Warm and dry.   Psychiatric: Patient alert and oriented to person, place, and time. Speech and behavior appropriate. Normal mood and affect.       ECG 12 Lead    Date/Time: 8/30/2023 9:52 AM  Performed by: Inga Solis APRN  Authorized by: Inga Solis APRN   Comparison: compared with previous ECG   Similar to previous ECG  Rhythm: sinus rhythm  BPM: 68          Assessment:       Diagnosis Plan   1. Atrial fibrillation, persistent        2. H/O cardiac radiofrequency ablation--7/2023 PVI               Plan:       1.-2. PAF, s/p PVI in July--initially had increase in episodes but only 1 episode in the last 2 weeks. Metoprolol as needed, remains on rivaroxaban.     Follow up with Dr. Agustin in 3 months and will have her schedule follow up with Dr. Macedo in about 9 months for routine visit.    As always, it has been a " pleasure to participate in your patient's care.      Sincerely,         ROBERTO CARLOS Cali

## 2023-09-11 ENCOUNTER — TELEPHONE (OUTPATIENT)
Dept: GASTROENTEROLOGY | Facility: CLINIC | Age: 68
End: 2023-09-11
Payer: MEDICARE

## 2023-09-11 NOTE — TELEPHONE ENCOUNTER
Dr Agustin,  Dr Hand has this pt scheduled for a colonoscopy on 10/20/23 and would like to know if it is ok for her to hold her Xarelto for 48 hours prior?  Thanks  Amirah DAVID/Dr Hand

## 2023-09-11 NOTE — TELEPHONE ENCOUNTER
I have called the pt and let her know she is cleared to hold her xarelto for 48 hours prior to her scope.  Pt verbalized understanding.

## 2023-09-11 NOTE — TELEPHONE ENCOUNTER
Caller: Jackie Deleon    Relationship: Self    Best call back number: [phone]     What is the best time to reach you: EARLY MORNING, OKAY TO LEAVE VM    Who are you requesting to speak with (clinical staff, provider,  specific staff member): CLINICAL    What was the call regarding: PT IS ON XARELTO AND IS WANTING TO KNOW IF/WHEN SHE NEEDS TO STOP THIS BEFORE HER PROCEDURE ON 10/20/23. PT TAKES IT IN THE EVENING. PLEASE CALL PT BACK AND ADVISE.     Is it okay if the provider responds through MyChart: MYCHART OR PHONE CALL

## 2023-09-27 RX ORDER — RIVAROXABAN 20 MG/1
TABLET, FILM COATED ORAL
Qty: 90 TABLET | Refills: 1 | Status: SHIPPED | OUTPATIENT
Start: 2023-09-27

## 2023-10-09 ENCOUNTER — TELEPHONE (OUTPATIENT)
Dept: GASTROENTEROLOGY | Facility: CLINIC | Age: 68
End: 2023-10-09
Payer: MEDICARE

## 2023-10-09 NOTE — TELEPHONE ENCOUNTER
Caller: Jackie Deleon    Relationship to patient: Self    Best call back number: [phone]    Patient is needing: PT NEEDS TO RESCHEDULE COLONOSCOPY ON 10/20/23, HER DAUGHTER IS HAVING SURGERY THE SAME DAY. PLEASE CALL BACK AND ADVISE.

## 2023-10-20 ENCOUNTER — TELEPHONE (OUTPATIENT)
Dept: GASTROENTEROLOGY | Facility: CLINIC | Age: 68
End: 2023-10-20
Payer: MEDICARE

## 2023-10-20 NOTE — TELEPHONE ENCOUNTER
PT CALLED AND SHE ORIGINALLY HAD A SCOPE SCHEDULED FOR TODAY WITH DR MONIQUE AND SHE IS WANTING TO SEE IF SHE CAN GET RESCHEDULED THIS YEAR BEFORE THE END OF 2023 BECAUSE SHE HAS [facility] AND THEY MAY BE NON PAR WITH [employer] AFTER THE FIRST OF 2024.  SHE WOULD LIKE A CALL BACK.

## 2023-12-28 ENCOUNTER — HOSPITAL ENCOUNTER (OUTPATIENT)
Facility: HOSPITAL | Age: 68
Setting detail: HOSPITAL OUTPATIENT SURGERY
Discharge: HOME | End: 2023-12-28
Attending: INTERNAL MEDICINE | Admitting: INTERNAL MEDICINE
Payer: MEDICARE

## 2023-12-28 ENCOUNTER — ANESTHESIA EVENT (OUTPATIENT)
Dept: GASTROENTEROLOGY | Facility: HOSPITAL | Age: 68
End: 2023-12-28
Payer: MEDICARE

## 2023-12-28 ENCOUNTER — ANESTHESIA (OUTPATIENT)
Dept: GASTROENTEROLOGY | Facility: HOSPITAL | Age: 68
End: 2023-12-28
Payer: MEDICARE

## 2023-12-28 VITALS
RESPIRATION RATE: 16 BRPM | WEIGHT: 117 LBS | BODY MASS INDEX: 18.8 KG/M2 | DIASTOLIC BLOOD PRESSURE: 61 MMHG | OXYGEN SATURATION: 98 % | HEART RATE: 62 BPM | SYSTOLIC BLOOD PRESSURE: 97 MMHG | HEIGHT: 66 IN

## 2023-12-28 DIAGNOSIS — Z12.12 SCREENING FOR COLORECTAL CANCER: ICD-10-CM

## 2023-12-28 DIAGNOSIS — Z12.11 SCREENING FOR COLORECTAL CANCER: ICD-10-CM

## 2023-12-28 PROCEDURE — 25010000002 PHENYLEPHRINE 10 MG/ML SOLUTION: Performed by: ANESTHESIOLOGY

## 2023-12-28 PROCEDURE — 25810000003 LACTATED RINGERS PER 1000 ML: Performed by: INTERNAL MEDICINE

## 2023-12-28 PROCEDURE — 25010000002 PROPOFOL 10 MG/ML EMULSION: Performed by: ANESTHESIOLOGY

## 2023-12-28 PROCEDURE — S0260 H&P FOR SURGERY: HCPCS | Performed by: INTERNAL MEDICINE

## 2023-12-28 PROCEDURE — 88305 TISSUE EXAM BY PATHOLOGIST: CPT | Performed by: INTERNAL MEDICINE

## 2023-12-28 RX ORDER — PHENYLEPHRINE HYDROCHLORIDE 10 MG/ML
INJECTION INTRAVENOUS AS NEEDED
Status: DISCONTINUED | OUTPATIENT
Start: 2023-12-28 | End: 2023-12-28 | Stop reason: SURG

## 2023-12-28 RX ORDER — PROPOFOL 10 MG/ML
VIAL (ML) INTRAVENOUS AS NEEDED
Status: DISCONTINUED | OUTPATIENT
Start: 2023-12-28 | End: 2023-12-28 | Stop reason: SURG

## 2023-12-28 RX ORDER — LIDOCAINE HYDROCHLORIDE 20 MG/ML
INJECTION, SOLUTION INFILTRATION; PERINEURAL AS NEEDED
Status: DISCONTINUED | OUTPATIENT
Start: 2023-12-28 | End: 2023-12-28 | Stop reason: SURG

## 2023-12-28 RX ORDER — SODIUM CHLORIDE, SODIUM LACTATE, POTASSIUM CHLORIDE, CALCIUM CHLORIDE 600; 310; 30; 20 MG/100ML; MG/100ML; MG/100ML; MG/100ML
30 INJECTION, SOLUTION INTRAVENOUS CONTINUOUS PRN
Status: DISCONTINUED | OUTPATIENT
Start: 2023-12-28 | End: 2023-12-28 | Stop reason: HOSPADM

## 2023-12-28 RX ADMIN — PHENYLEPHRINE HYDROCHLORIDE 200 MCG: 10 INJECTION INTRAVENOUS at 11:27

## 2023-12-28 RX ADMIN — PROPOFOL 100 MCG/KG/MIN: 10 INJECTION, EMULSION INTRAVENOUS at 11:12

## 2023-12-28 RX ADMIN — PHENYLEPHRINE HYDROCHLORIDE 200 MCG: 10 INJECTION INTRAVENOUS at 11:16

## 2023-12-28 RX ADMIN — PROPOFOL 100 MG: 10 INJECTION, EMULSION INTRAVENOUS at 11:12

## 2023-12-28 RX ADMIN — SODIUM CHLORIDE, POTASSIUM CHLORIDE, SODIUM LACTATE AND CALCIUM CHLORIDE 30 ML/HR: 600; 310; 30; 20 INJECTION, SOLUTION INTRAVENOUS at 10:50

## 2023-12-28 RX ADMIN — LIDOCAINE HYDROCHLORIDE 100 MG: 20 INJECTION, SOLUTION INFILTRATION; PERINEURAL at 11:12

## 2023-12-28 NOTE — ANESTHESIA PREPROCEDURE EVALUATION
Anesthesia Evaluation     Patient summary reviewed and Nursing notes reviewed   NPO Solid Status: > 8 hours  NPO Liquid Status: > 4 hours           Airway   Mallampati: II  Neck ROM: full  No difficulty expected  Dental - normal exam     Pulmonary     breath sounds clear to auscultation  Cardiovascular     Rhythm: regular    (+) CAD, dysrhythmias Atrial Fib      Neuro/Psych  (+) psychiatric history Anxiety  GI/Hepatic/Renal/Endo      Musculoskeletal     Abdominal    Substance History      OB/GYN          Other                      Anesthesia Plan    ASA 3     MAC     intravenous induction     Anesthetic plan, risks, benefits, and alternatives have been provided, discussed and informed consent has been obtained with: patient.    CODE STATUS:

## 2023-12-28 NOTE — DISCHARGE INSTRUCTIONS
For the next 24 hours patient needs to be with a responsible adult.    For 24 hours DO NOT drive, operate machinery, appliances, drink alcohol, make important decisions or sign legal documents.    Start with a light or bland diet if you are feeling sick to your stomach otherwise advance to regular diet as tolerated.    Follow recommendations on procedure report if provided by your doctor.    Call Dr Hand for problems [phone]    Problems may include but not limited to: large amounts of bleeding, trouble breathing, repeated vomiting, severe unrelieved pain, fever or chills.

## 2023-12-28 NOTE — ANESTHESIA POSTPROCEDURE EVALUATION
Patient: Jackie Deleon    Procedure Summary       Date: 12/28/23 Room / Location:  [facility] /  [facility]    Anesthesia Start: 1109 Anesthesia Stop: 1136    Procedure: COLONOSCOPY INTO TERMINAL ILEUM WITH POLYPECTOMY (COLD) Diagnosis:       Polyp of colon      Diverticulosis      Hemorrhoids      (Screening for colorectal cancer [Z12.11, Z12.12])    Surgeons: Jesus Manuel Hand MD Provider: Surya Osborn MD    Anesthesia Type: MAC ASA Status: 3            Anesthesia Type: MAC    Vitals  Vitals Value Taken Time   BP 97/61 12/28/23 1153   Temp     Pulse 62 12/28/23 1153   Resp 16 12/28/23 1153   SpO2 98 % 12/28/23 1153           Post Anesthesia Care and Evaluation    Patient location during evaluation: PHASE II  Level of consciousness: awake  Pain management: adequate    Airway patency: patent  Anesthetic complications: No anesthetic complications    Cardiovascular status: acceptable  Respiratory status: acceptable  Hydration status: acceptable

## 2023-12-28 NOTE — H&P
"[facility]  Pre Procedure History & Physical    Chief Complaint:   Screening for colorectal cancer    Subjective     HPI:   This 68-year-old female presents the endoscopy suite for colonoscopic examination.  This is a screening assessment for colorectal cancer.  Some reference made to a history of colon polyps dating back to 2013.    Past Medical History:   Past Medical History:   Diagnosis Date    Abnormal ECG 03/14/22    Anxiety     Atrial fibrillation 03/14/22    Colon polyp 02/13    Benign    Coronary artery disease 04/22    A-Fib    Osteopenia     Osteoporosis        Past Surgical History:  Past Surgical History:   Procedure Laterality Date    ADENOIDECTOMY  child    CARDIAC ELECTROPHYSIOLOGY PROCEDURE N/A 7/20/2023    Procedure: Ablation atrial fibrillation;  Surgeon: Juwan Agustin MD;  Location: [facility] INVASIVE LOCATION;  Service: Cardiovascular;  Laterality: N/A;    COLONOSCOPY      D & C AND LAPAROSCOPY      EYE SURGERY  2009    lens implants    TONSILLECTOMY         Family History:  Family History   Problem Relation Age of Onset    Hypertension Mother     Multiple myeloma Mother     Arthritis Mother     Cancer Mother     COPD Mother     Depression Mother     Hypertension Father     Prostate cancer Father     Arthritis Father     Cancer Father     COPD Father     Breast cancer Paternal Aunt     Heart disease Maternal Grandfather     Stroke Maternal Grandfather     Malig Hyperthermia Neg Hx        Social History:   reports that she has never smoked. She has never used smokeless tobacco. She reports current alcohol use of about 3.0 - 5.0 standard drinks of alcohol per week. She reports that she does not use drugs.    Medications:   No medications prior to admission.       Allergies:  Patient has no known allergies.    ROS:    Pertinent items are noted in HPI     Objective     Height 166.4 cm (65.5\"), not currently breastfeeding.    Physical Exam   Constitutional: Pt is oriented " to person, place, and time and well-developed, well-nourished, and in no distress.   Mouth/Throat: Oropharynx is clear and moist.   Neck: Normal range of motion.   Cardiovascular: Normal rate, regular rhythm and normal heart sounds.    Pulmonary/Chest: Effort normal and breath sounds normal.   Abdominal: Soft. Nontender  Skin: Skin is warm and dry.   Psychiatric: Mood, memory, affect and judgment normal.     Assessment & Plan     Diagnosis:  Screening for colorectal cancer    Anticipated Surgical Procedure:  Colonoscopy    The risks, benefits, and alternatives of this procedure have been discussed with the patient or the responsible party- the patient understands and agrees to proceed.

## 2023-12-29 LAB
LAB AP CASE REPORT: NORMAL
PATH REPORT.FINAL DX SPEC: NORMAL
PATH REPORT.GROSS SPEC: NORMAL

## 2024-01-24 ENCOUNTER — TELEPHONE (OUTPATIENT)
Dept: GASTROENTEROLOGY | Facility: CLINIC | Age: 69
End: 2024-01-24
Payer: MEDICARE

## 2024-01-24 NOTE — TELEPHONE ENCOUNTER
----- Message from Jesus Manuel TORRES MD sent at 1/24/2024  3:02 PM EST -----  Regarding: biopsy results  OK to call results, F/U C/S in 10 years, office F/U sooner prn  ----- Message -----  From: Lab, Background User  Sent: 12/29/2023   8:40 AM EST  To: Jesus Manuel TORRES MD

## 2024-01-24 NOTE — TELEPHONE ENCOUNTER
Patient notified of results per path and recommendations and verbalized understanding      Hm and cs recall placed 12/28/33

## 2024-02-02 ENCOUNTER — OFFICE VISIT (OUTPATIENT)
Age: 69
End: 2024-02-02
Payer: MEDICARE

## 2024-02-02 VITALS
DIASTOLIC BLOOD PRESSURE: 80 MMHG | HEIGHT: 66 IN | WEIGHT: 121.8 LBS | SYSTOLIC BLOOD PRESSURE: 112 MMHG | BODY MASS INDEX: 19.58 KG/M2 | HEART RATE: 67 BPM

## 2024-02-02 DIAGNOSIS — I48.19 ATRIAL FIBRILLATION, PERSISTENT: Primary | ICD-10-CM

## 2024-02-02 DIAGNOSIS — Z98.890 H/O CARDIAC RADIOFREQUENCY ABLATION: ICD-10-CM

## 2024-02-02 PROCEDURE — 93000 ELECTROCARDIOGRAM COMPLETE: CPT | Performed by: INTERNAL MEDICINE

## 2024-02-02 PROCEDURE — 99214 OFFICE O/P EST MOD 30 MIN: CPT | Performed by: INTERNAL MEDICINE

## 2024-02-02 NOTE — PROGRESS NOTES
Date of Office Visit: 2024  Encounter Provider: Juwna Agustin MD  Place of Service: [facility] CARDIOLOGY  Patient Name: Jackie Deleon  : 1955    Subjective:     Encounter Date:2024      Patient ID: Jackie Deleon is a 69 y.o. female who has a cc of  pers AF and I did PVI in July and no AF since and she feels great     The patient had a good year.   No anginal chest pain,   No sig manuel,   No soa,   No fainting,  No orthostasis.   No edema.   Exercise tolerance: good     There have been no hospital admission since the last visit.     There have been no bleeding events.       Past Medical History:   Diagnosis Date    Abnormal ECG 22    Anxiety     Atrial fibrillation 22    Colon polyp     Benign    Coronary artery disease     A-Fib    Osteopenia     Osteoporosis        Social History     Socioeconomic History    Marital status:     Number of children: 2   Tobacco Use    Smoking status: Never    Smokeless tobacco: Never    Tobacco comments:     caffeine use   Vaping Use    Vaping Use: Never used   Substance and Sexual Activity    Alcohol use: Yes     Alcohol/week: 3.0 - 5.0 standard drinks of alcohol     Types: 3 - 5 Glasses of wine per week     Comment: 5-7/week    Drug use: No    Sexual activity: Not Currently     Partners: Male     Birth control/protection: None       Family History   Problem Relation Age of Onset    Hypertension Mother     Multiple myeloma Mother     Arthritis Mother     Cancer Mother     COPD Mother     Depression Mother     Hypertension Father     Prostate cancer Father     Arthritis Father     Cancer Father     COPD Father     Breast cancer Paternal Aunt     Heart disease Maternal Grandfather     Stroke Maternal Grandfather     Malig Hyperthermia Neg Hx        Review of Systems   Constitutional: Negative for fever and night sweats.   HENT:  Negative for ear pain and stridor.    Eyes:  Negative for discharge and visual halos.  "  Cardiovascular:  Negative for cyanosis.   Respiratory:  Negative for hemoptysis and sputum production.    Hematologic/Lymphatic: Negative for adenopathy.   Skin:  Negative for nail changes and unusual hair distribution.   Musculoskeletal:  Negative for gout and joint swelling.   Gastrointestinal:  Negative for bowel incontinence and flatus.   Genitourinary:  Negative for dysuria and flank pain.   Neurological:  Negative for seizures and tremors.   Psychiatric/Behavioral:  Negative for altered mental status. The patient is not nervous/anxious.             Objective:     Vitals:    02/02/24 0840   BP: 112/80   Pulse: 67   Weight: 55.2 kg (121 lb 12.8 oz)   Height: 167.6 cm (66\")         Eyes:      General:         Right eye: No discharge.         Left eye: No discharge.   HENT:      Head: Normocephalic and atraumatic.   Neck:      Thyroid: No thyromegaly.      Vascular: No JVD.   Pulmonary:      Effort: Pulmonary effort is normal.      Breath sounds: Normal breath sounds. No rales.   Cardiovascular:      Normal rate. Regular rhythm.      No gallop.    Edema:     Peripheral edema absent.   Abdominal:      General: Bowel sounds are normal.      Palpations: Abdomen is soft.      Tenderness: There is no abdominal tenderness.   Musculoskeletal: Normal range of motion.         General: No deformity. Skin:     General: Skin is warm and dry.      Findings: No erythema.   Neurological:      Mental Status: Alert and oriented to person, place, and time.      Motor: Normal muscle tone.   Psychiatric:         Behavior: Behavior normal.         Thought Content: Thought content normal.           ECG 12 Lead    Date/Time: 2/2/2024 9:36 AM  Performed by: Juwan Agustin MD    Authorized by: Juwan Agustin MD  Rhythm: sinus rhythm  Rate: normal  Conduction: conduction normal  ST Segments: ST segments normal  T Waves: T waves normal  QRS axis: normal    Clinical impression: normal ECG          Lab Review:       Assessment:          " Diagnosis Plan   1. Atrial fibrillation, persistent        2. H/O cardiac radiofrequency ablation--7/2023 PVI               Plan:     Great result -- I think we can stop the r-ban and use if she goes back into AF for > 24 hours.     Her chads score is 0

## 2024-04-15 ENCOUNTER — OFFICE VISIT (OUTPATIENT)
Dept: FAMILY MEDICINE CLINIC | Facility: CLINIC | Age: 69
End: 2024-04-15
Payer: MEDICARE

## 2024-04-15 VITALS
SYSTOLIC BLOOD PRESSURE: 110 MMHG | BODY MASS INDEX: 19.83 KG/M2 | DIASTOLIC BLOOD PRESSURE: 76 MMHG | HEART RATE: 74 BPM | WEIGHT: 119 LBS | RESPIRATION RATE: 16 BRPM | OXYGEN SATURATION: 97 % | HEIGHT: 65 IN

## 2024-04-15 DIAGNOSIS — Z00.00 MEDICARE ANNUAL WELLNESS VISIT, SUBSEQUENT: Primary | ICD-10-CM

## 2024-04-15 DIAGNOSIS — Z12.31 BREAST CANCER SCREENING BY MAMMOGRAM: ICD-10-CM

## 2024-04-15 DIAGNOSIS — Z98.890 H/O CARDIAC RADIOFREQUENCY ABLATION: ICD-10-CM

## 2024-04-15 PROCEDURE — 1159F MED LIST DOCD IN RCRD: CPT | Performed by: NURSE PRACTITIONER

## 2024-04-15 PROCEDURE — 1170F FXNL STATUS ASSESSED: CPT | Performed by: NURSE PRACTITIONER

## 2024-04-15 PROCEDURE — 1160F RVW MEDS BY RX/DR IN RCRD: CPT | Performed by: NURSE PRACTITIONER

## 2024-04-15 PROCEDURE — G0439 PPPS, SUBSEQ VISIT: HCPCS | Performed by: NURSE PRACTITIONER

## 2024-04-15 NOTE — PROGRESS NOTES
The ABCs of the Annual Wellness Visit  Subsequent Medicare Wellness Visit    Subjective    Jackie Deleon is a 69 y.o. female who presents for a Subsequent Medicare Wellness Visit.    The following portions of the patient's history were reviewed and   updated as appropriate: allergies, current medications, past family history, past medical history, past social history, past surgical history, and problem list.    Compared to one year ago, the patient feels her physical   health is better.    Compared to one year ago, the patient feels her mental   health is better.    Recent Hospitalizations:  She was not admitted to the hospital during the last year.       Current Medical Providers:  Patient Care Team:  Grazyna Russo APRN as PCP - General (Family Medicine)  Pancho Jennings MD as Consulting Physician (Allergy and Immunology)    Outpatient Medications Prior to Visit   Medication Sig Dispense Refill    Bioflavonoid Products (Sera-C) 500-550 MG tablet       Calcium-Vitamin D-Vitamin K 500-500-40 MG-UNT-MCG chewable tablet Chew.      Cobalamin Combinations (B-12) 100-5000 MCG sublingual tablet       glucosamine-chondroitin 500-400 MG capsule capsule Take  by mouth 3 (three) times a day with meals.      Magnesium 100 MG capsule       metoprolol tartrate (LOPRESSOR) 25 MG tablet Take 1 tablet by mouth As Needed (for a fib episode.  Hold for top BP # below 100 or HR below 60). (Patient taking differently: Take 1 tablet by mouth As Needed (for a fib episode.  Hold for top BP # below 100 or HR below 60). PRN ONLY) 90 tablet 1    Turmeric 500 MG tablet Take  by mouth. 1,000       No facility-administered medications prior to visit.       No opioid medication identified on active medication list. I have reviewed chart for other potential  high risk medication/s and harmful drug interactions in the elderly.        Aspirin is not on active medication list.  Aspirin use is not indicated based on review of current medical  "condition/s. Risk of harm outweighs potential benefits.  .    Patient Active Problem List   Diagnosis    Osteoporosis    Atrial fibrillation, persistent    Paroxysmal atrial fibrillation    Chronic anticoagulation    Atrial fibrillation    Osteoporosis    Screening for colorectal cancer    H/O cardiac radiofrequency ablation--2023 PVI     Advance Care Planning   Advance Care Planning     Advance Directive is on file.  ACP discussion was held with the patient during this visit. Patient has an advance directive in EMR which is still valid.      Objective    Vitals:    04/15/24 0758   BP: 110/76   Pulse: 74   Resp: 16   SpO2: 97%   Weight: 54 kg (119 lb)   Height: 165.1 cm (65\")     Estimated body mass index is 19.8 kg/m² as calculated from the following:    Height as of this encounter: 165.1 cm (65\").    Weight as of this encounter: 54 kg (119 lb).    BMI is within normal parameters. No other follow-up for BMI required.      Does the patient have evidence of cognitive impairment? No    Lab Results   Component Value Date    CHLPL 200 2024    TRIG 31 2024    HDL 98 (H) 2024    LDL 96 2024    VLDL 6 2024        HEALTH RISK ASSESSMENT    Smoking Status:  Social History     Tobacco Use   Smoking Status Never   Smokeless Tobacco Never   Tobacco Comments    caffeine use     Alcohol Consumption:  Social History     Substance and Sexual Activity   Alcohol Use Yes    Alcohol/week: 3.0 - 5.0 standard drinks of alcohol    Types: 3 - 5 Glasses of wine per week    Comment: 5-7/week     Fall Risk Screen:    [place] Fall Risk Assessment was completed, and patient is at LOW risk for falls.Assessment completed on:4/15/2024    Depression Screenin/15/2024     8:00 AM   PHQ-2/PHQ-9 Depression Screening   Little Interest or Pleasure in Doing Things 0-->not at all   Feeling Down, Depressed or Hopeless 0-->not at all   PHQ-9: Brief Depression Severity Measure Score 0       Health Habits and Functional " and Cognitive Screenin/15/2024     8:00 AM   Functional & Cognitive Status   Do you have difficulty preparing food and eating? No   Do you have difficulty bathing yourself, getting dressed or grooming yourself? No   Do you have difficulty using the toilet? No   Do you have difficulty moving around from place to place? No   Do you have trouble with steps or getting out of a bed or a chair? No   Current Diet Well Balanced Diet   Dental Exam Up to date   Eye Exam Up to date   Exercise (times per week) 7 times per week   Current Exercises Include Walking   Do you need help using the phone?  No   Are you deaf or do you have serious difficulty hearing?  No   Do you need help to go to places out of walking distance? No   Do you need help shopping? No   Do you need help preparing meals?  No   Do you need help with housework?  No   Do you need help with laundry? No   Do you need help taking your medications? No   Do you need help managing money? No   Do you ever drive or ride in a car without wearing a seat belt? No   Have you felt unusual stress, anger or loneliness in the last month? No   Who do you live with? Spouse   If you need help, do you have trouble finding someone available to you? No   Have you been bothered in the last four weeks by sexual problems? No   Do you have difficulty concentrating, remembering or making decisions? No       Age-appropriate Screening Schedule:  Refer to the list below for future screening recommendations based on patient's age, sex and/or medical conditions. Orders for these recommended tests are listed in the plan section. The patient has been provided with a written plan.    Health Maintenance   Topic Date Due    MAMMOGRAM  2024    RSV Vaccine - Adults (1 - 1-dose 60+ series) 04/15/2025 (Originally 2013)    INFLUENZA VACCINE  2024    ANNUAL WELLNESS VISIT  04/15/2025    DXA SCAN  2025    TDAP/TD VACCINES (3 - Td or Tdap) 2032    COLORECTAL CANCER  "SCREENING  12/28/2033    HEPATITIS C SCREENING  Completed    COVID-19 Vaccine  Completed    Pneumococcal Vaccine 65+  Completed    ZOSTER VACCINE  Completed    PAP SMEAR  Discontinued                  CMS Preventative Services Quick Reference  Risk Factors Identified During Encounter  Immunizations Discussed/Encouraged: RSV (Respiratory Syncytial Virus)  Dental Screening Recommended  Vision Screening Recommended  The above risks/problems have been discussed with the patient.  Pertinent information has been shared with the patient in the After Visit Summary.  An After Visit Summary and PPPS were made available to the patient.    Follow Up:   Next Medicare Wellness visit to be scheduled in 1 year.   {Wrapup  Review (Popup)  Advance Care Planning  Labs  CC  Problem List  Visit Diagnosis  Medications  Result Review  Imaging  Health Maintenance  Quality  BestPractice  SmartSets  SnapShot  Encounters  Notes  Media  Procedures :23}    Additional E&M Note during same encounter follows:  Patient has multiple medical problems which are significant and separately identifiable that require additional work above and beyond the Medicare Wellness Visit.      Chief Complaint  Annual Exam    Subjective    {Problem List  Visit Diagnosis   Encounters  Notes  Medications  Labs  Result Review Imaging  Media :23}    LENIN Deleon is also being seen today for ***         Objective   Vital Signs:  /76   Pulse 74   Resp 16   Ht 165.1 cm (65\")   Wt 54 kg (119 lb)   SpO2 97%   BMI 19.80 kg/m²     Physical Exam     {The following data was reviewed by (Optional):55755}  {Ambulatory Labs (Optional):23940}  {Data reviewed (Optional):63167:::1}           Assessment and Plan {CC Problem List  Visit Diagnosis   ROS  Review (Popup)  Health Maintenance  Quality  BestPractice  Medications  SmartSets  SnapShot Encounters  Media :23}  Diagnoses and all orders for this visit:    1. Medicare annual " wellness visit, subsequent (Primary)    2. H/O cardiac radiofrequency ablation--7/2023 PVI           {Time Spent (Optional):91879}  Follow Up {Instructions Charge Capture  Follow-up Communications :23}  Return in about 1 year (around 4/15/2025) for Medicare Wellness.  Patient was given instructions and counseling regarding her condition or for health maintenance advice. Please see specific information pulled into the AVS if appropriate.

## 2024-04-15 NOTE — PROGRESS NOTES
The ABCs of the Annual Wellness Visit  Subsequent Medicare Wellness Visit    Subjective      Jackie Deleon is a 69 y.o. female who presents for a Subsequent Medicare Wellness Visit.    The following portions of the patient's history were reviewed and   updated as appropriate: allergies, current medications, past family history, past medical history, past social history, past surgical history, and problem list.    Compared to one year ago, the patient feels her physical   health is better.    Compared to one year ago, the patient feels her mental   health is better.    Recent Hospitalizations:  She was not admitted to the hospital during the last year.       Current Medical Providers:  Patient Care Team:  Grazyna Russo APRN as PCP - General (Family Medicine)  Pancho Jennings MD as Consulting Physician (Allergy and Immunology)    Outpatient Medications Prior to Visit   Medication Sig Dispense Refill    Bioflavonoid Products (Sera-C) 500-550 MG tablet       Calcium-Vitamin D-Vitamin K 500-500-40 MG-UNT-MCG chewable tablet Chew.      Cobalamin Combinations (B-12) 100-5000 MCG sublingual tablet       glucosamine-chondroitin 500-400 MG capsule capsule Take  by mouth 3 (three) times a day with meals.      Magnesium 100 MG capsule       metoprolol tartrate (LOPRESSOR) 25 MG tablet Take 1 tablet by mouth As Needed (for a fib episode.  Hold for top BP # below 100 or HR below 60). (Patient taking differently: Take 1 tablet by mouth As Needed (for a fib episode.  Hold for top BP # below 100 or HR below 60). PRN ONLY) 90 tablet 1    Turmeric 500 MG tablet Take  by mouth. 1,000       No facility-administered medications prior to visit.       No opioid medication identified on active medication list. I have reviewed chart for other potential  high risk medication/s and harmful drug interactions in the elderly.        Aspirin is not on active medication list.  Aspirin use is not indicated based on review of current medical  "condition/s. Risk of harm outweighs potential benefits.  .    Patient Active Problem List   Diagnosis    Osteoporosis    Atrial fibrillation, persistent    Paroxysmal atrial fibrillation    Chronic anticoagulation    Atrial fibrillation    Osteoporosis    Screening for colorectal cancer    H/O cardiac radiofrequency ablation--2023 PVI     Advance Care Planning   Advance Care Planning     Advance Directive is on file.  ACP discussion was held with the patient during this visit. Patient has an advance directive in EMR which is still valid.      Objective    Vitals:    04/15/24 0758   BP: 110/76   Pulse: 74   Resp: 16   SpO2: 97%   Weight: 54 kg (119 lb)   Height: 165.1 cm (65\")     Estimated body mass index is 19.8 kg/m² as calculated from the following:    Height as of this encounter: 165.1 cm (65\").    Weight as of this encounter: 54 kg (119 lb).    BMI is within normal parameters. No other follow-up for BMI required.      Does the patient have evidence of cognitive impairment?   No    Lab Results   Component Value Date    CHLPL 200 2024    TRIG 31 2024    HDL 98 (H) 2024    LDL 96 2024    VLDL 6 2024          HEALTH RISK ASSESSMENT    Smoking Status:  Social History     Tobacco Use   Smoking Status Never   Smokeless Tobacco Never   Tobacco Comments    caffeine use     Alcohol Consumption:  Social History     Substance and Sexual Activity   Alcohol Use Yes    Alcohol/week: 3.0 - 5.0 standard drinks of alcohol    Types: 3 - 5 Glasses of wine per week    Comment: 5-7/week     Fall Risk Screen:    [place] Fall Risk Assessment was completed, and patient is at LOW risk for falls.Assessment completed on:4/15/2024    Depression Screenin/15/2024     8:00 AM   PHQ-2/PHQ-9 Depression Screening   Little Interest or Pleasure in Doing Things 0-->not at all   Feeling Down, Depressed or Hopeless 0-->not at all   PHQ-9: Brief Depression Severity Measure Score 0       Health Habits and " Functional and Cognitive Screenin/15/2024     8:00 AM   Functional & Cognitive Status   Do you have difficulty preparing food and eating? No   Do you have difficulty bathing yourself, getting dressed or grooming yourself? No   Do you have difficulty using the toilet? No   Do you have difficulty moving around from place to place? No   Do you have trouble with steps or getting out of a bed or a chair? No   Current Diet Well Balanced Diet   Dental Exam Up to date   Eye Exam Up to date   Exercise (times per week) 7 times per week   Current Exercises Include Walking   Do you need help using the phone?  No   Are you deaf or do you have serious difficulty hearing?  No   Do you need help to go to places out of walking distance? No   Do you need help shopping? No   Do you need help preparing meals?  No   Do you need help with housework?  No   Do you need help with laundry? No   Do you need help taking your medications? No   Do you need help managing money? No   Do you ever drive or ride in a car without wearing a seat belt? No   Have you felt unusual stress, anger or loneliness in the last month? No   Who do you live with? Spouse   If you need help, do you have trouble finding someone available to you? No   Have you been bothered in the last four weeks by sexual problems? No   Do you have difficulty concentrating, remembering or making decisions? No       Age-appropriate Screening Schedule:  Refer to the list below for future screening recommendations based on patient's age, sex and/or medical conditions. Orders for these recommended tests are listed in the plan section. The patient has been provided with a written plan.    Health Maintenance   Topic Date Due    MAMMOGRAM  2024    RSV Vaccine - Adults (1 - 1-dose 60+ series) 04/15/2025 (Originally 2013)    INFLUENZA VACCINE  2024    ANNUAL WELLNESS VISIT  04/15/2025    DXA SCAN  2025    TDAP/TD VACCINES (3 - Td or Tdap) 2032    COLORECTAL  CANCER SCREENING  12/28/2033    HEPATITIS C SCREENING  Completed    COVID-19 Vaccine  Completed    Pneumococcal Vaccine 65+  Completed    ZOSTER VACCINE  Completed    PAP SMEAR  Discontinued                  CMS Preventative Services Quick Reference  Risk Factors Identified During Encounter:    Immunizations Discussed/Encouraged: RSV (Respiratory Syncytial Virus) declines  Dental Screening Recommended  Vision Screening Recommended    The above risks/problems have been discussed with the patient.  Pertinent information has been shared with the patient in the After Visit Summary.    Diagnoses and all orders for this visit:    1. Medicare annual wellness visit, subsequent (Primary)    2. H/O cardiac radiofrequency ablation--7/2023 PVI    Preventative care- Follow heart healthy diet, drink water, walk daily. Wear seatbelts, wear helmets, wear sunscreens. Follow CDC guidelines for covid and flu     Enc hand weights/ankle weights w walking and some weight bearing exercises.     Follow Up:   Next Medicare Wellness visit to be scheduled in 1 year.      An After Visit Summary and PPPS were made available to the patient.

## 2024-05-31 ENCOUNTER — OFFICE VISIT (OUTPATIENT)
Dept: CARDIOLOGY | Facility: CLINIC | Age: 69
End: 2024-05-31
Payer: MEDICARE

## 2024-05-31 VITALS
SYSTOLIC BLOOD PRESSURE: 122 MMHG | OXYGEN SATURATION: 98 % | BODY MASS INDEX: 20.33 KG/M2 | DIASTOLIC BLOOD PRESSURE: 80 MMHG | HEIGHT: 65 IN | WEIGHT: 122 LBS | HEART RATE: 70 BPM

## 2024-05-31 DIAGNOSIS — Z98.890 H/O CARDIAC RADIOFREQUENCY ABLATION: ICD-10-CM

## 2024-05-31 DIAGNOSIS — I48.0 PAROXYSMAL ATRIAL FIBRILLATION: Primary | ICD-10-CM

## 2024-05-31 PROCEDURE — 99214 OFFICE O/P EST MOD 30 MIN: CPT | Performed by: INTERNAL MEDICINE

## 2024-05-31 NOTE — PROGRESS NOTES
PATIENTINFORMATION    Date of Office Visit: 2024  Encounter Provider: Erich Samuel MD  Place of Service: [facility] CARDIOLOGY  Patient Name: Jackie Deleon  : 1955    Subjective:     Encounter Date:2024      Patient ID: Jackie Deleon is a 69 y.o. female.    Chief Complaint   Patient presents with    Atrial Fibrillation     HPI  Ms. Deleon is a pleasant 69 years old lady who came to cardiology clinic for follow-up visit.  He had successful A-fib ablation in July last year with few recurrences of A-fib in the first month.  No recurrence after that.  She feels great and exercise regularly walking for an hour and a half without limiting symptoms.  No complaints today.      ROS  All systems reviewed and negative except as noted in HPI.    Past Medical History:   Diagnosis Date    Abnormal ECG 22    Anxiety     Atrial fibrillation 22    Colon polyp     Benign    Coronary artery disease     A-Fib    Osteopenia     Osteoporosis        Past Surgical History:   Procedure Laterality Date    ADENOIDECTOMY  child    CARDIAC ELECTROPHYSIOLOGY PROCEDURE N/A 2023    Procedure: Ablation atrial fibrillation;  Surgeon: Juwan Agustin MD;  Location: [facility] CATH INVASIVE LOCATION;  Service: Cardiovascular;  Laterality: N/A;    CARDIAC SURGERY      Cardiac ablation    COLONOSCOPY      COLONOSCOPY N/A 2023    Procedure: COLONOSCOPY INTO TERMINAL ILEUM WITH POLYPECTOMY (COLD);  Surgeon: Jesus Manuel Hand MD;  Location: [facility] ENDOSCOPY;  Service: Gastroenterology;  Laterality: N/A;  PREOP/ SCREENING- POSTOP/ POLYP, DIVERTICULOSIS, HEMORRHOIDS    D & C AND LAPAROSCOPY      EYE SURGERY      lens implants    TONSILLECTOMY         Social History     Socioeconomic History    Marital status:     Number of children: 2   Tobacco Use    Smoking status: Never    Smokeless tobacco: Never    Tobacco comments:     caffeine use   Vaping Use    Vaping  "status: Never Used   Substance and Sexual Activity    Alcohol use: Yes     Alcohol/week: 3.0 - 5.0 standard drinks of alcohol     Types: 3 - 5 Glasses of wine per week     Comment: 5-7/week    Drug use: No    Sexual activity: Not Currently     Partners: Male     Birth control/protection: None       Family History   Problem Relation Age of Onset    Hypertension Mother     Multiple myeloma Mother     Arthritis Mother     Cancer Mother     COPD Mother     Depression Mother     Hypertension Father     Prostate cancer Father     Arthritis Father     Cancer Father     COPD Father     Breast cancer Paternal Aunt     Heart disease Maternal Grandfather     Stroke Maternal Grandfather     Malig Hyperthermia Neg Hx          Procedures       Objective:     /80   Pulse 70   Ht 165.1 cm (65\")   Wt 55.3 kg (122 lb)   SpO2 98%   BMI 20.30 kg/m²  Body mass index is 20.3 kg/m².     Constitutional:       General: Not in acute distress.     Appearance: Well-developed. Not diaphoretic.   Eyes:      Pupils: Pupils are equal, round, and reactive to light.   HENT:      Head: Normocephalic and atraumatic.   Neck:      Thyroid: No thyromegaly.   Pulmonary:      Effort: Pulmonary effort is normal. No respiratory distress.      Breath sounds: Normal breath sounds. No wheezing. No rales.   Chest:      Chest wall: Not tender to palpatation.   Cardiovascular:      Normal rate. Regular rhythm.      No gallop.    Pulses:     Intact distal pulses.   Edema:     Peripheral edema absent.   Abdominal:      General: Bowel sounds are normal. There is no distension.      Palpations: Abdomen is soft.      Tenderness: There is no guarding.   Musculoskeletal: Normal range of motion.         General: No deformity.      Cervical back: Normal range of motion and neck supple. Skin:     General: Skin is warm and dry.      Findings: No rash.   Neurological:      Mental Status: Alert and oriented to person, place, and time.      Cranial Nerves: No cranial " nerve deficit.      Deep Tendon Reflexes: Reflexes are normal and symmetric.   Psychiatric:         Judgment: Judgment normal.         Review Of Data: I have reviewed pertinent recent labs, images and documents and pertinent findings included in HPI or assessment below.    Lipid Panel          4/12/2024    07:54   Lipid Panel   Total Cholesterol 200    Triglycerides 31    HDL Cholesterol 98    VLDL Cholesterol 6    LDL Cholesterol  96    LDL/HDL Ratio 0.98          Assessment/Plan:     History of symptomatic paroxysmal atrial fibrillation-previously treated with amiodarone that was discontinued because of side effects.  She has problems with beta-blockers because of low blood pressure.  She was temporarily treated with digoxin.  Status post successful A-fib ablation in July 2023.  No known recurrence.  Xarelto discontinued per EP.   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  Actually MR and TR improved on follow-up echo.  Only trace to mild MR.  History of mild cardiomyopathy likely tachycardia induced-left ventricular ejection fraction of 46-50% based on echo in April 2022 .  LVEF returned to normal in January 2023 echocardiogram.  Improved MR with trace to mild MR/TR.  Mild thoracic aortic aneurysm: Ascending aorta measuring 3.8 cm-stable  Normal treadmill test in June 2022    Ms. Deleon is doing very well from cardiac standpoint.  She will continue to exercise.  Follow-up in 1 year or sooner with concerning symptoms.    Diagnosis and plan of care discussed with patient and verbalized understanding.            Your medication list            Accurate as of May 31, 2024  8:18 AM. If you have any questions, ask your nurse or doctor.                CHANGE how you take these medications        Instructions Last Dose Given Next Dose Due   metoprolol tartrate 25 MG tablet  Commonly known as: LOPRESSOR  What changed: additional instructions      Take 1 tablet by mouth As Needed (for a fib episode.   Hold for top BP # below 100 or HR below 60).              CONTINUE taking these medications        Instructions Last Dose Given Next Dose Due   B-12 100-5000 MCG sublingual tablet           Calcium-Vitamin D-Vitamin K 500-500-40 MG-UNT-MCG chewable tablet      Chew.       Sera-C 500-550 MG tablet           glucosamine-chondroitin 500-400 MG capsule capsule      Take  by mouth 3 (three) times a day with meals.       Magnesium 100 MG capsule           Turmeric 500 MG tablet      Take  by mouth. 1,000                    Erich Samuel MD  05/31/24  08:18 EDT

## 2024-10-17 ENCOUNTER — TELEPHONE (OUTPATIENT)
Dept: CARDIOLOGY | Facility: CLINIC | Age: 69
End: 2024-10-17
Payer: MEDICARE

## 2024-10-17 DIAGNOSIS — I48.0 PAROXYSMAL ATRIAL FIBRILLATION: Primary | ICD-10-CM

## 2024-10-17 NOTE — TELEPHONE ENCOUNTER
I spoke with Jackie Deleon and gave them message from the provider.  They verbalized understanding & have no further questions at this time.  She's agreeable to monitor.   Scheduling, please call to set up.                                               Thank you,    Emily NORRIS , RN  Triage [facility]  10/17/24 09:58 EDT

## 2024-10-17 NOTE — TELEPHONE ENCOUNTER
"Pt had AF ablation 7/2023.  Pt says she doesn't know what her HR was during the episode because her Apple watch was dead.  \"It was maybe 90-something, it wasn't over 100\".  She felt her pulse and could tell she was in AF because it was irregular, and she felt the palpitations in her neck.  She ended up having a total of 3 episodes of AF that day, all pretty close together in timing.  All three episodes included the vision change; \"it was a split second, I was fine, the vision returned\".    BP and HR at home \"have been fine\".  She took the metoprolol PRN after she had her third episode.  She made sure to drink plenty of water and then she didn't have any more episodes.    Do you have any recommendations for this patient?    Thank you,    Emily NORRIS RN  [facility] Triage  10/17/24  09:12 EDT    "

## 2024-10-17 NOTE — TELEPHONE ENCOUNTER
I sent an authorization request to [employer], will call to schedule when it is approved by insurance.

## 2024-11-21 ENCOUNTER — TELEPHONE (OUTPATIENT)
Dept: CARDIOLOGY | Facility: CLINIC | Age: 69
End: 2024-11-21
Payer: MEDICARE

## 2024-11-21 NOTE — TELEPHONE ENCOUNTER
I called patient and updated her on her Holter results.  She is to take 81 mg aspirin daily per recommendation by Dr. Samuel.  All questions answered.

## 2025-02-03 ENCOUNTER — OFFICE VISIT (OUTPATIENT)
Age: 70
End: 2025-02-03
Payer: MEDICARE

## 2025-02-03 VITALS
SYSTOLIC BLOOD PRESSURE: 130 MMHG | DIASTOLIC BLOOD PRESSURE: 84 MMHG | WEIGHT: 125 LBS | HEIGHT: 65 IN | HEART RATE: 56 BPM | BODY MASS INDEX: 20.83 KG/M2

## 2025-02-03 DIAGNOSIS — I48.19 ATRIAL FIBRILLATION, PERSISTENT: Primary | ICD-10-CM

## 2025-02-03 DIAGNOSIS — Z98.890 H/O CARDIAC RADIOFREQUENCY ABLATION: ICD-10-CM

## 2025-02-03 DIAGNOSIS — I71.21 ANEURYSM OF ASCENDING AORTA WITHOUT RUPTURE: ICD-10-CM

## 2025-02-03 PROBLEM — I71.20 THORACIC AORTIC ANEURYSM: Status: ACTIVE | Noted: 2025-02-03

## 2025-02-03 PROCEDURE — 1160F RVW MEDS BY RX/DR IN RCRD: CPT | Performed by: PHYSICIAN ASSISTANT

## 2025-02-03 PROCEDURE — 99214 OFFICE O/P EST MOD 30 MIN: CPT | Performed by: PHYSICIAN ASSISTANT

## 2025-02-03 PROCEDURE — 1159F MED LIST DOCD IN RCRD: CPT | Performed by: PHYSICIAN ASSISTANT

## 2025-02-03 PROCEDURE — 93000 ELECTROCARDIOGRAM COMPLETE: CPT | Performed by: PHYSICIAN ASSISTANT

## 2025-02-03 NOTE — PROGRESS NOTES
ELECTROPHYSIOLOGY   Date of Office Visit: 2025  Patient Name: Jackie Deleon  : 1955  Encounter Provider: Edenilson Salcido PA-C  Primary Cardiologist: Erich Samuel MD  Electrophysiologist: Dr. Agustin  CHIEF COMPLAINT / REASON FOR OFFICE VISIT     paroxysmal AFIB   1 year follow up     HISTORY OF PRESENT ILLNESS     This is a 70 y.o. year old female who presents to [facility] CARDIOLOGY for a 1 year follow up of cardiovascular health.     She has a history of paroxysmal atrial fibrillation dating back to 2022. She was started on Xarelto and was given metoprolol PRN for tachycardia. A Holter monitor was ordered and showed she was in persistent atrial fibrillation. Dr. Samuel tried to start her on daily metoprolol, but she was unable to tolerate due to low BP and only takes as PRN. She was started on amiodarone and scheduled for a CV, but spontaneously converted back to SR.     Amiodarone was discontinued in 2022 due to patient's concerns about the side effects.     In , she started having Afib episodes every few days, lasting hours at a time. She wanted to pursue an ablation was referred to Dr. Agustin and started on digoxin in the meantime.     S/p  PVI ablation on 2023. Had some episodes of Afib the first 2 weeks post-op, but significantly decreased by 4 weeks post-op.     In Oct 2024, patient had an episode of Afib that caused lightheadedness and vision loss lasting a few minutes. She was tachycardic and took an extra dose of metoprolol and returned back to normal after 45 minutes. She had a total of three episodes. She was working in her garden a few days prior and was dehydrated.     A Holter monitor was ordered and showed a total Afib burden of 0.05% with the longest episode lasting 3 minutes and 48 seconds. She was started on aspirin.     Today the patient reports other than the episodes of atrial fibrillation in Oct 2024 she has not had any  "episodes since. She denies SOA, dyspnea, orthopnea, PND, syncope or chest pain.     She continues to be active and is keeping herself hydrated. No significant changes in exercise capability compared to last year.     She is currently taking aspirin 81 mg daily as a preventative measure instead of AC. Denies bleeding complications.     PMHx:  Persistent atrial fibrillation s/p PVI ablation 7/2023, mild thoracic aortic aneurysm     PHYSICAL EXAMINATION     Vital Signs:  /84 (BP Location: Left arm, Patient Position: Sitting)   Pulse 56   Ht 165.1 cm (65\")   Wt 56.7 kg (125 lb)   BMI 20.80 kg/m²   Estimated body mass index is 20.8 kg/m² as calculated from the following:    Height as of this encounter: 165.1 cm (65\").    Weight as of this encounter: 56.7 kg (125 lb).       BMI is within normal parameters. No other follow-up for BMI required.      Physical Exam  Vitals reviewed.   Constitutional:       Appearance: Normal appearance.   Neck:      Vascular: No carotid bruit.   Cardiovascular:      Rate and Rhythm: Normal rate and regular rhythm.      Pulses: Normal pulses.      Heart sounds: Normal heart sounds.   Pulmonary:      Effort: Pulmonary effort is normal.      Breath sounds: Normal breath sounds.   Musculoskeletal:      Right lower leg: No edema.      Left lower leg: No edema.   Skin:     General: Skin is warm and dry.   Neurological:      Mental Status: She is alert and oriented to person, place, and time.          Cardiac Testing/Results     Cardiac Testing:   - Echo (12/05/2022): EF of 57.5%, mild bileaflet mitral valve prolapse present. Trace mitral valve regurgitation present. Mild dilation of the ascending aorta is present measuring 3.8 cm.     -Stress Test (6/3/2022): No evidence of myocardial ischemia     Result Review :  The following data was reviewed by: Edenilson Salcido PA-C on 02/03/2025:    Lipid Panel          4/12/2024    07:54   Lipid Panel   Total Cholesterol 200    Triglycerides " "31    HDL Cholesterol 98    VLDL Cholesterol 6    LDL Cholesterol  96    LDL/HDL Ratio 0.98       Lab Results   Component Value Date     04/12/2024     07/05/2023    K 4.4 04/12/2024    K 5.2 07/05/2023     04/12/2024     07/05/2023    CO2 27.4 04/12/2024    CO2 24.9 07/05/2023    BUN 17 04/12/2024    BUN 13 07/05/2023    CREATININE 0.72 04/12/2024    CREATININE 0.69 07/05/2023    EGFRIFNONA 103 06/22/2021    EGFRIFNONA 81 03/10/2020    EGFRIFAFRI >60 01/25/2022    EGFRIFAFRI 125 06/22/2021    GLUCOSE 81 04/12/2024    GLUCOSE 90 07/05/2023    CALCIUM 9.2 04/12/2024    CALCIUM 9.3 07/05/2023    ALBUMIN 4.2 04/12/2024    ALBUMIN 4.2 02/21/2023    AST 20 04/12/2024    AST 21 02/21/2023    ALT 14 04/12/2024    ALT 13 02/21/2023     Lab Results   Component Value Date    WBC 5.13 04/12/2024    WBC 4.87 07/05/2023    HGB 13.2 04/12/2024    HGB 14.4 07/05/2023    HCT 40.0 04/12/2024    HCT 43.4 07/05/2023    MCV 87.3 04/12/2024    MCV 84.4 07/05/2023     04/12/2024     07/05/2023     No results found for: \"PROBNP\", \"BNP\"  No results found for: \"CKTOTAL\", \"CKMB\", \"CKMBINDEX\", \"TROPONINI\", \"TROPONINT\"  Lab Results   Component Value Date    TSH 0.665 02/21/2023    TSH 1.690 03/24/2022                 ECG 12 Lead    Date/Time: 2/3/2025 9:21 AM  Performed by: Edenilson Gasca PA-C    Authorized by: Edenilson Gasca PA-C  Comparison: compared with previous ECG from 2/2/2024  Similar to previous ECG  Rhythm: sinus bradycardia  Rate: bradycardic  BPM: 56  Conduction: conduction normal  ST Segments: ST segments normal  T Waves: T waves normal  QRS axis: normal  Other: no other findings    Clinical impression: normal ECG            ASSESSMENT & PLAN       Diagnoses and all orders for this visit:    1-2. Atrial fibrillation, persistent (Primary) H/O cardiac radiofrequency ablation--7/2023 PVI  Not presenting with concerning symptoms related to atrial fibrillation. No new episodes since " she wore the Holter monitor in Oct 2024.   Dr. Samuel started her on aspirin 81 mg daily for preventative measures.   Her CHADVASC score is 2 -- age and sex.   Her atrial fibrillation burden is extremely low and her risk of stroke is 2.2%. Based on this, we believe she does not need to be on AC, patient agrees.   Continue to take metoprolol 25 mg PRN for Afib episodes. Hold if BP < 100   She will follow up with Dr. Samuel from now on. If needed, we will see her if she begins to have increasing episodes of Afib, but things have been holding steady and patient is aware of her triggers.   3. Aneurysm of ascending aorta without rupture  ECHO in 2022 showed mild ascending aortic aneurysm at 3.6 cm.   She sees Dr. Samuel for this and they are monitoring for any changes.    Follow Up:  Return if symptoms worsen or fail to improve.  Patient was given instructions and counseling regarding her condition or for health maintenance advice. Please contact office if worsening symptoms or proceed to ER when appropriate.      Edenilson Salcido PA-C  02/03/25  09:58 EST    MEDICATIONS         Discharge Medications            Accurate as of February 3, 2025  9:58 AM. If you have any questions, ask your nurse or doctor.                Changes to Medications        Instructions Start Date   metoprolol tartrate 25 MG tablet  Commonly known as: LOPRESSOR  What changed: additional instructions   25 mg, Oral, As Needed             Continue These Medications        Instructions Start Date   ASPIRIN 81 PO   81 mg, Daily      B-12 100-5000 MCG sublingual tablet   No dose, route, or frequency recorded.      Calcium-Vitamin D-Vitamin K 500-500-40 MG-UNT-MCG chewable tablet   Chew.      Sera-C 500-550 MG tablet   No dose, route, or frequency recorded.      glucosamine-chondroitin 500-400 MG capsule capsule   3 Times Daily With Meals      Magnesium 100 MG capsule   No dose, route, or frequency recorded.      Turmeric 500 MG  tablet   Take  by mouth. 1,000                   **Dragon Disclaimer: This note was dictated using an electronic transcription. The electronic translation of spoken language may permit erroneous, or at times, nonsensical words or phrases to be inadvertently transcribed. Although I have reviewed the note for such errors, some may still exist.

## 2025-03-18 ENCOUNTER — TELEPHONE (OUTPATIENT)
Dept: FAMILY MEDICINE CLINIC | Facility: CLINIC | Age: 70
End: 2025-03-18

## 2025-03-18 NOTE — TELEPHONE ENCOUNTER
Caller: Jackie Deleon    Relationship: Self    Best call back number: [phone]    What orders are you requesting (i.e. lab or imaging): LABS FOR MED WELLNESS    In what timeframe would the patient need to come in: ONE WEEK PRIOR TO WELLNESS APPT  4/17/25    Where will you receive your lab/imaging services: [facility]     Additional notes:

## 2025-04-08 DIAGNOSIS — Z00.00 MEDICARE ANNUAL WELLNESS VISIT, SUBSEQUENT: Primary | ICD-10-CM

## 2025-04-14 LAB
ALBUMIN SERPL-MCNC: 4 G/DL (ref 3.5–5.2)
ALBUMIN/GLOB SERPL: 1.8 G/DL
ALP SERPL-CCNC: 45 U/L (ref 39–117)
ALT SERPL-CCNC: 14 U/L (ref 1–33)
AST SERPL-CCNC: 24 U/L (ref 1–32)
BASOPHILS # BLD AUTO: 0.03 10*3/MM3 (ref 0–0.2)
BASOPHILS NFR BLD AUTO: 0.7 % (ref 0–1.5)
BILIRUB SERPL-MCNC: 0.4 MG/DL (ref 0–1.2)
BUN SERPL-MCNC: 18 MG/DL (ref 8–23)
BUN/CREAT SERPL: 27.3 (ref 7–25)
CALCIUM SERPL-MCNC: 9.3 MG/DL (ref 8.6–10.5)
CHLORIDE SERPL-SCNC: 103 MMOL/L (ref 98–107)
CHOLEST SERPL-MCNC: 185 MG/DL (ref 0–200)
CHOLEST/HDLC SERPL: 1.93 {RATIO}
CO2 SERPL-SCNC: 29.1 MMOL/L (ref 22–29)
CREAT SERPL-MCNC: 0.66 MG/DL (ref 0.57–1)
EGFRCR SERPLBLD CKD-EPI 2021: 93.3 ML/MIN/1.73
EOSINOPHIL # BLD AUTO: 0.11 10*3/MM3 (ref 0–0.4)
EOSINOPHIL NFR BLD AUTO: 2.7 % (ref 0.3–6.2)
ERYTHROCYTE [DISTWIDTH] IN BLOOD BY AUTOMATED COUNT: 12.6 % (ref 12.3–15.4)
GLOBULIN SER CALC-MCNC: 2.2 GM/DL
GLUCOSE SERPL-MCNC: 84 MG/DL (ref 65–99)
HCT VFR BLD AUTO: 41.6 % (ref 34–46.6)
HDLC SERPL-MCNC: 96 MG/DL (ref 40–60)
HGB BLD-MCNC: 14.1 G/DL (ref 12–15.9)
IMM GRANULOCYTES # BLD AUTO: 0 10*3/MM3 (ref 0–0.05)
IMM GRANULOCYTES NFR BLD AUTO: 0 % (ref 0–0.5)
LDLC SERPL CALC-MCNC: 82 MG/DL (ref 0–100)
LYMPHOCYTES # BLD AUTO: 0.91 10*3/MM3 (ref 0.7–3.1)
LYMPHOCYTES NFR BLD AUTO: 22.2 % (ref 19.6–45.3)
MCH RBC QN AUTO: 29.6 PG (ref 26.6–33)
MCHC RBC AUTO-ENTMCNC: 33.9 G/DL (ref 31.5–35.7)
MCV RBC AUTO: 87.2 FL (ref 79–97)
MONOCYTES # BLD AUTO: 0.36 10*3/MM3 (ref 0.1–0.9)
MONOCYTES NFR BLD AUTO: 8.8 % (ref 5–12)
NEUTROPHILS # BLD AUTO: 2.69 10*3/MM3 (ref 1.7–7)
NEUTROPHILS NFR BLD AUTO: 65.6 % (ref 42.7–76)
NRBC BLD AUTO-RTO: 0 /100 WBC (ref 0–0.2)
PLATELET # BLD AUTO: 232 10*3/MM3 (ref 140–450)
POTASSIUM SERPL-SCNC: 4.4 MMOL/L (ref 3.5–5.2)
PROT SERPL-MCNC: 6.2 G/DL (ref 6–8.5)
RBC # BLD AUTO: 4.77 10*6/MM3 (ref 3.77–5.28)
SODIUM SERPL-SCNC: 141 MMOL/L (ref 136–145)
TRIGL SERPL-MCNC: 29 MG/DL (ref 0–150)
VLDLC SERPL CALC-MCNC: 7 MG/DL (ref 5–40)
WBC # BLD AUTO: 4.1 10*3/MM3 (ref 3.4–10.8)

## 2025-04-17 ENCOUNTER — OFFICE VISIT (OUTPATIENT)
Dept: FAMILY MEDICINE CLINIC | Facility: CLINIC | Age: 70
End: 2025-04-17
Payer: MEDICARE

## 2025-04-17 VITALS
HEIGHT: 65 IN | RESPIRATION RATE: 18 BRPM | HEART RATE: 66 BPM | OXYGEN SATURATION: 99 % | WEIGHT: 126 LBS | BODY MASS INDEX: 20.99 KG/M2

## 2025-04-17 DIAGNOSIS — Z78.0 POST-MENOPAUSE: ICD-10-CM

## 2025-04-17 DIAGNOSIS — Z00.00 ENCOUNTER FOR SUBSEQUENT ANNUAL WELLNESS VISIT (AWV) IN MEDICARE PATIENT: Primary | ICD-10-CM

## 2025-04-17 DIAGNOSIS — Z12.31 BREAST CANCER SCREENING BY MAMMOGRAM: ICD-10-CM

## 2025-04-17 NOTE — PROGRESS NOTES
Subjective   The ABCs of the Annual Wellness Visit  Medicare Wellness Visit      Jackie Deleon is a 70 y.o. patient who presents for a Medicare Wellness Visit.    The following portions of the patient's history were reviewed and   updated as appropriate: allergies, current medications, past family history, past medical history, past social history, past surgical history, and problem list.    Compared to one year ago, the patient's physical   health is the same.  Compared to one year ago, the patient's mental   health is better.    Recent Hospitalizations:  She was not admitted to the hospital during the last year.     Current Medical Providers:  Patient Care Team:  Grazyna Russo APRN as PCP - General (Family Medicine)  Pancho Jennings MD as Consulting Physician (Allergy and Immunology)    Outpatient Medications Prior to Visit   Medication Sig Dispense Refill    ASPIRIN 81 PO Take 81 mg by mouth Daily.      Bioflavonoid Products (Sera-C) 500-550 MG tablet       Calcium-Vitamin D-Vitamin K 500-500-40 MG-UNT-MCG chewable tablet Chew.      Cobalamin Combinations (B-12) 100-5000 MCG sublingual tablet       glucosamine-chondroitin 500-400 MG capsule capsule Take  by mouth 3 (three) times a day with meals.      Magnesium 100 MG capsule       metoprolol tartrate (LOPRESSOR) 25 MG tablet Take 1 tablet by mouth As Needed (for a fib episode.  Hold for top BP # below 100 or HR below 60). (Patient taking differently: Take 1 tablet by mouth As Needed (for a fib episode.  Hold for top BP # below 100 or HR below 60). PRN ONLY) 90 tablet 1    Turmeric 500 MG tablet Take  by mouth. 1,000       No facility-administered medications prior to visit.     No opioid medication identified on active medication list. I have reviewed chart for other potential  high risk medication/s and harmful drug interactions in the elderly.      Aspirin is on active medication list. Aspirin use is indicated based on review of current medical  "condition/s. Pros and cons of this therapy have been discussed today. Benefits of this medication outweigh potential harm.  Patient has been encouraged to continue taking this medication.  .      Patient Active Problem List   Diagnosis    Osteoporosis    Atrial fibrillation, persistent    Paroxysmal atrial fibrillation    Chronic anticoagulation    Atrial fibrillation    Osteoporosis    Screening for colorectal cancer    H/O cardiac radiofrequency ablation--7/2023 PVI    Thoracic aortic aneurysm     Advance Care Planning {Advance Care Planning Hyperlink:23}Advance Directive is on file.  ACP discussion was held with the patient during this visit. Patient has an advance directive in EMR which is still valid.             Objective   Vitals:    04/17/25 0745   Pulse: 66   Resp: 18   SpO2: 99%   Weight: 57.2 kg (126 lb)   Height: 165.1 cm (65\")       Estimated body mass index is 20.97 kg/m² as calculated from the following:    Height as of this encounter: 165.1 cm (65\").    Weight as of this encounter: 57.2 kg (126 lb).    BMI is within normal parameters. No other follow-up for BMI required.    {Help Text;  If you change Medicare Wellness reason for visit to a Welcome to Medicare reason for visit after the encounter has started, please add SmartPhrase .GAITBALANCE to your note for proper gait and balance documentation. This text will disappear after the note is signed:37466}       Does the patient have evidence of cognitive impairment? No  Lab Results   Component Value Date    CHLPL 185 04/14/2025    TRIG 29 04/14/2025    HDL 96 (H) 04/14/2025    LDL 82 04/14/2025    VLDL 7 04/14/2025                                                                                                Health  Risk Assessment    Smoking Status:  Social History     Tobacco Use   Smoking Status Never   Smokeless Tobacco Never   Tobacco Comments    caffeine use     Alcohol Consumption:  Social History     Substance and Sexual Activity   Alcohol Use " Yes    Alcohol/week: 3.0 - 5.0 standard drinks of alcohol    Types: 3 - 5 Glasses of wine per week    Comment: 5-7/week       Fall Risk Screen{Jump to Steadi Fall Risk Flowsheet:23}  STEADI Fall Risk Assessment was completed, and patient is at MODERATE risk for falls. Assessment completed on:2025    Depression Screening{Jump to PHQ SmartForm:23}   Little interest or pleasure in doing things? Not at all   Feeling down, depressed, or hopeless? Not at all   PHQ-2 Total Score 0      Health Habits and Functional and Cognitive Screenin/17/2025     7:45 AM   Functional & Cognitive Status   Do you have difficulty preparing food and eating? No   Do you have difficulty bathing yourself, getting dressed or grooming yourself? No   Do you have difficulty using the toilet? No   Do you have difficulty moving around from place to place? No   Do you have trouble with steps or getting out of a bed or a chair? No   Current Diet Well Balanced Diet   Dental Exam Up to date   Eye Exam Up to date   Exercise (times per week) 7 times per week   Current Exercises Include Gardening;Hiking;House Cleaning;Light Weights;Walking;Yard Work   Do you need help using the phone?  No   Are you deaf or do you have serious difficulty hearing?  No   Do you need help to go to places out of walking distance? No   Do you need help shopping? No   Do you need help preparing meals?  No   Do you need help with housework?  No   Do you need help with laundry? No   Do you need help taking your medications? No   Do you need help managing money? No   Do you ever drive or ride in a car without wearing a seat belt? No   Have you felt unusual stress, anger or loneliness in the last month? No   Who do you live with? Spouse   If you need help, do you have trouble finding someone available to you? No   Have you been bothered in the last four weeks by sexual problems? No   Do you have difficulty concentrating, remembering or making decisions? No            Age-appropriate Screening Schedule:  Refer to the list below for future screening recommendations based on patient's age, sex and/or medical conditions. Orders for these recommended tests are listed in the plan section. The patient has been provided with a written plan.    Health Maintenance List  Health Maintenance   Topic Date Due    ANNUAL WELLNESS VISIT  04/15/2025    DXA SCAN  05/09/2025    COVID-19 Vaccine (8 - 2024-25 season) 05/11/2025    INFLUENZA VACCINE  07/01/2025    MAMMOGRAM  06/25/2026    TDAP/TD VACCINES (3 - Td or Tdap) 03/04/2032    COLORECTAL CANCER SCREENING  12/28/2033    HEPATITIS C SCREENING  Completed    Pneumococcal Vaccine 50+  Completed    ZOSTER VACCINE  Completed                                                                                                                                                CMS Preventative Services Quick Reference  Risk Factors Identified During Encounter  Dental Screening Recommended  Vision Screening Recommended    The above risks/problems have been discussed with the patient.  Pertinent information has been shared with the patient in the After Visit Summary.  An After Visit Summary and PPPS were made available to the patient.    Follow Up:{Wrapup  Review (Popup)  Advance Care Planning  Labs  CC  Problem List  Visit Diagnosis  Medications  Result Review  Imaging  [facility]  [facility]  [facility]  SnapShot  Encounters  Notes  Media  Procedures :23}   Next Medicare Wellness visit to be scheduled in 1 year.         Additional E&M Note during same encounter follows:  Patient has additional, significant, and separately identifiable condition(s)/problem(s) that require work above and beyond the Medicare Wellness Visit     Chief Complaint  No chief complaint on file.    Subjective {Problem List  Visit Diagnosis   Encounters  Notes  Medications  Labs  Result Review Imaging  Media :23} {Help Text;  If performing  "a Preventative Medicine Visit (e.g. 99397) in addition to AWV, choose the 1st SmartList option below and document any ROS/PE performed.  If performing a separately identifiable E/M service (e.g. 40709), choose 2nd SmartLink option below. This information in red will not appear in the final note after note is signed:95055}  HPI  {AWV/Preventative Exam/EM Progress Note. Use this note if billing for additional Wellness Visit or EM exam in addition to AWV visit (Optional):[id number]}                 Objective   Vital Signs:  Pulse 66   Resp 18   Ht 165.1 cm (65\")   Wt 57.2 kg (126 lb)   SpO2 99%   BMI 20.97 kg/m²   Physical Exam          {The following data was reviewed by (Optional):34124}  {Data reviewed (Optional):40900:::1}  {Ambulatory Labs (Optional):72503}    Results             Assessment and Plan {CC Problem List  Visit Diagnosis   ROS  Review (Popup)  Health Maintenance  Quality  BestPractice  Medications  SmartSets  SnapShot Encounters  Media :23}{Help Text; When performing an adult Preventative Medicine Visit (e.g. 87391) using the optional SmartList below can help when documenting any age-appropriate advice given.  When using the SmartList review and update the information based on the unique discussion or advice given to the patient.  As a reminder, diagnosis code Z00.00 (nl exam) or Z00.01 (abnl exam), should be added when this service is performed.  Text will disappear once the note is signed:44956}{Preventative Physical Additional Health Advice List (Optional):[id number]}              {Time Spent (Optional):82813}  Follow Up {Instructions Charge Capture  Follow-up Communications :23}  No follow-ups on file.  Patient was given instructions and counseling regarding her condition or for health maintenance advice. Please see specific information pulled into the AVS if appropriate.  Patient or patient representative verbalized consent for the use of Ambient Listening during the visit with "  ROBERTO CARLOS Hooks for chart documentation. 4/17/2025  08:24 EDT

## 2025-04-17 NOTE — PROGRESS NOTES
Subjective   The ABCs of the Annual Wellness Visit  Medicare Wellness Visit      Jackie Deleon is a 70 y.o. patient who presents for a Medicare Wellness Visit.    The following portions of the patient's history were reviewed and   updated as appropriate: allergies, current medications, past family history, past medical history, past social history, past surgical history, and problem list.    Compared to one year ago, the patient's physical   health is the same.  Compared to one year ago, the patient's mental   health is better.    Recent Hospitalizations:  She was not admitted to the hospital during the last year.     Current Medical Providers:  Patient Care Team:  Grazyna Russo APRN as PCP - General (Family Medicine)  Pancho Jennings MD as Consulting Physician (Allergy and Immunology)    Outpatient Medications Prior to Visit   Medication Sig Dispense Refill    ASPIRIN 81 PO Take 81 mg by mouth Daily.      Bioflavonoid Products (Sera-C) 500-550 MG tablet       Calcium-Vitamin D-Vitamin K 500-500-40 MG-UNT-MCG chewable tablet Chew.      Cobalamin Combinations (B-12) 100-5000 MCG sublingual tablet       glucosamine-chondroitin 500-400 MG capsule capsule Take  by mouth 3 (three) times a day with meals.      Magnesium 100 MG capsule       metoprolol tartrate (LOPRESSOR) 25 MG tablet Take 1 tablet by mouth As Needed (for a fib episode.  Hold for top BP # below 100 or HR below 60). (Patient taking differently: Take 1 tablet by mouth As Needed (for a fib episode.  Hold for top BP # below 100 or HR below 60). PRN ONLY) 90 tablet 1    Turmeric 500 MG tablet Take  by mouth. 1,000       No facility-administered medications prior to visit.     No opioid medication identified on active medication list. I have reviewed chart for other potential  high risk medication/s and harmful drug interactions in the elderly.      Aspirin is on active medication list. Aspirin use is indicated based on review of current medical  "condition/s. Pros and cons of this therapy have been discussed today. Benefits of this medication outweigh potential harm.  Patient has been encouraged to continue taking this medication.  .      Patient Active Problem List   Diagnosis    Osteoporosis    Atrial fibrillation, persistent    Paroxysmal atrial fibrillation    Chronic anticoagulation    Atrial fibrillation    Osteoporosis    Screening for colorectal cancer    H/O cardiac radiofrequency ablation--7/2023 PVI    Thoracic aortic aneurysm     Advance Care Planning Advance Directive is on file.  ACP discussion was held with the patient during this visit. Patient has an advance directive in EMR which is still valid.             Objective   Vitals:    04/17/25 0745   Pulse: 66   Resp: 18   SpO2: 99%   Weight: 57.2 kg (126 lb)   Height: 165.1 cm (65\")       Estimated body mass index is 20.97 kg/m² as calculated from the following:    Height as of this encounter: 165.1 cm (65\").    Weight as of this encounter: 57.2 kg (126 lb).    BMI is within normal parameters. No other follow-up for BMI required.           Does the patient have evidence of cognitive impairment? No  Lab Results   Component Value Date    CHLPL 185 04/14/2025    TRIG 29 04/14/2025    HDL 96 (H) 04/14/2025    LDL 82 04/14/2025    VLDL 7 04/14/2025                                                                                               Health  Risk Assessment    Smoking Status:  Social History     Tobacco Use   Smoking Status Never   Smokeless Tobacco Never   Tobacco Comments    caffeine use     Alcohol Consumption:  Social History     Substance and Sexual Activity   Alcohol Use Yes    Alcohol/week: 3.0 - 5.0 standard drinks of alcohol    Types: 3 - 5 Glasses of wine per week    Comment: 5-7/week       Fall Risk Screen  STEADI Fall Risk Assessment was completed, and patient is at MODERATE risk for falls. Assessment completed on:4/17/2025    Depression Screening   Little interest or pleasure in " doing things? Not at all   Feeling down, depressed, or hopeless? Not at all   PHQ-2 Total Score 0      Health Habits and Functional and Cognitive Screenin/17/2025     7:45 AM   Functional & Cognitive Status   Do you have difficulty preparing food and eating? No   Do you have difficulty bathing yourself, getting dressed or grooming yourself? No   Do you have difficulty using the toilet? No   Do you have difficulty moving around from place to place? No   Do you have trouble with steps or getting out of a bed or a chair? No   Current Diet Well Balanced Diet   Dental Exam Up to date   Eye Exam Up to date   Exercise (times per week) 7 times per week   Current Exercises Include Gardening;Hiking;House Cleaning;Light Weights;Walking;Yard Work   Do you need help using the phone?  No   Are you deaf or do you have serious difficulty hearing?  No   Do you need help to go to places out of walking distance? No   Do you need help shopping? No   Do you need help preparing meals?  No   Do you need help with housework?  No   Do you need help with laundry? No   Do you need help taking your medications? No   Do you need help managing money? No   Do you ever drive or ride in a car without wearing a seat belt? No   Have you felt unusual stress, anger or loneliness in the last month? No   Who do you live with? Spouse   If you need help, do you have trouble finding someone available to you? No   Have you been bothered in the last four weeks by sexual problems? No   Do you have difficulty concentrating, remembering or making decisions? No           Age-appropriate Screening Schedule:  Refer to the list below for future screening recommendations based on patient's age, sex and/or medical conditions. Orders for these recommended tests are listed in the plan section. The patient has been provided with a written plan.    Health Maintenance List  Health Maintenance   Topic Date Due    DXA SCAN  2025    COVID-19 Vaccine (8 -  2024-25 season) 05/11/2025    INFLUENZA VACCINE  07/01/2025    ANNUAL WELLNESS VISIT  04/17/2026    MAMMOGRAM  06/25/2026    TDAP/TD VACCINES (3 - Td or Tdap) 03/04/2032    HEPATITIS C SCREENING  Completed    Pneumococcal Vaccine 50+  Completed    ZOSTER VACCINE  Completed    COLORECTAL CANCER SCREENING  Discontinued                                                                                                                                                CMS Preventative Services Quick Reference  Risk Factors Identified During Encounter  Dental Screening Recommended  Vision Screening Recommended    The above risks/problems have been discussed with the patient.  Pertinent information has been shared with the patient in the After Visit Summary.  An After Visit Summary and PPPS were made available to the patient.    Follow Up:   Next Medicare Wellness visit to be scheduled in 1 year.     Assessment & Plan  Encounter for subsequent annual wellness visit (AWV) in Medicare patient         Post-menopause    Orders:    DEXA Bone Density Axial; Future    Breast cancer screening by mammogram    Orders:    Mammo Screening Digital Tomosynthesis Bilateral With CAD; Future     Preventative care- Follow heart healthy diet, drink water, walk daily. Wear seatbelts, wear helmets, wear sunscreens. Follow CDC guidelines for covid and flu       Follow Up:   No follow-ups on file.

## 2025-06-03 ENCOUNTER — OFFICE VISIT (OUTPATIENT)
Age: 70
End: 2025-06-03
Payer: MEDICARE

## 2025-06-03 VITALS
WEIGHT: 122 LBS | HEIGHT: 65 IN | HEART RATE: 55 BPM | OXYGEN SATURATION: 100 % | BODY MASS INDEX: 20.33 KG/M2 | DIASTOLIC BLOOD PRESSURE: 60 MMHG | SYSTOLIC BLOOD PRESSURE: 110 MMHG

## 2025-06-03 DIAGNOSIS — I48.0 PAROXYSMAL ATRIAL FIBRILLATION: Primary | ICD-10-CM

## 2025-06-03 PROCEDURE — 99214 OFFICE O/P EST MOD 30 MIN: CPT | Performed by: NURSE PRACTITIONER

## 2025-06-03 PROCEDURE — 93000 ELECTROCARDIOGRAM COMPLETE: CPT | Performed by: NURSE PRACTITIONER

## 2025-06-03 PROCEDURE — 1159F MED LIST DOCD IN RCRD: CPT | Performed by: NURSE PRACTITIONER

## 2025-06-03 PROCEDURE — 1160F RVW MEDS BY RX/DR IN RCRD: CPT | Performed by: NURSE PRACTITIONER

## 2025-06-03 RX ORDER — OMEGA-3 FATTY ACIDS/FISH OIL 360-1200MG
CAPSULE ORAL
COMMUNITY
Start: 2025-05-01

## 2025-06-03 RX ORDER — GINSENG 100 MG
CAPSULE ORAL
COMMUNITY
Start: 2025-05-01

## 2025-06-03 NOTE — PROGRESS NOTES
Date of Office Visit: 2025  Encounter Provider: ROBERTO CARLOS Meade  Place of Service: [facility] CARDIOLOGY  Patient Name: Jackie Deleon  :1955    Chief complaint, paroxysmal A-fib    HPI: Jackie Deleon is a 70 y.o. female who is a patient of Dr. Samuel and is new to me today.  She has a history of paroxysmal atrial fibrillation in the past she is followed with Dr. Agustin with electrophysiology.  She was started on Xarelto and as needed metoprolol.  We tried starting her on metoprolol daily but she did not tolerate it due to low blood pressure.  She eventually was started on amiodarone and was scheduled for cardioversion but spontaneously went back into sinus rhythm.  Her amiodarone was discontinued in 2022.  In  she started having A-fib episodes every couple days she wanted to pursue an ablation and was started on digoxin.  She underwent PVI ablation on 2023.  She had some A-fib episodes the first 2 weeks but significantly decreased by week 4.  In 2024 she had an episode of A-fib that cause lightheadedness and loss of vision for few minutes.  She took an extra dose of metoprolol and went back to sinus rhythm after 45 minutes she has had a total of 3 episodes since that time.  She has not had any episodes since she has a low stroke burden and she has been kept on aspirin only.  She also has a mildly enlarged ascending aorta at 3.6 cm.    She comes in today for follow-up overall she is doing well she actually used some metoprolol last night as she felt her heart rate was up and she was anxious.  She does not think she was in atrial fibrillation.  She walks an hour to an hour and a half a day.  She has 2 guerrero retriever's and stays very active with them.  She denies any chest pain pressure or tightness.      Previous testing and notes have been reviewed by me.     Ivett 3, 2022 stress test  Patient exercised for 12 minutes per  standard [facility] protocol.  No ECG evidence of myocardial ischemia. Negative clinical evidence of myocardial ischemia. Findings consistent with a normal ECG stress test.    January 2023 echocardiogram       Left ventricular systolic function is normal. Calculated left ventricular EF = 57.5%    Left ventricular diastolic function was normal.    There are myxomatous changes of the mitral valve apparatus present. There is mild bileaflet mitral valve prolapse present. Trace mitral valve regurgitation present.    Mild dilation of the ascending aorta is present measuring 3.8 cm.    Compared to prior study on 04/01/2022 left ventricular ejection fraction has normalized on this study.  Improved mitral and tricuspid regurgitation noted.      Latest Reference Range & Units Most Recent   Sodium 136 - 145 mmol/L 141  4/14/25 08:21   Potassium 3.5 - 5.2 mmol/L 4.4  4/14/25 08:21   Chloride 98 - 107 mmol/L 103  4/14/25 08:21   CO2 22.0 - 29.0 mmol/L 29.1 (H)  4/14/25 08:21   CO2 22 - 29 mmol/L 26 (E)  1/25/22 09:23   Anion Gap 5.0 - 15.0 mmol/L 8.1  7/5/23 09:49   BUN 8 - 23 mg/dL 18  4/14/25 08:21   Creatinine 0.57 - 1.00 mg/dL 0.66  4/14/25 08:21   BUN/Creatinine Ratio 7.0 - 25.0  27.3 (H)  4/14/25 08:21   eGFR >60.0 mL/min/1.73 93.5  7/5/23 09:49   EGFR Result >60.0 mL/min/1.73 93.3  4/14/25 08:21   Est GFR by Clearance >60 /1.73 m2 >60 (E)  1/25/22 09:23   Glucose 65 - 99 mg/dL 84  4/14/25 08:21   Calcium 8.6 - 10.5 mg/dL 9.3  4/14/25 08:21   Magnesium 1.6 - 2.4 mg/dL 2.0  4/5/22 07:24   Alkaline Phosphatase 39 - 117 U/L 45  4/14/25 08:21   Total Protein 6.0 - 8.5 g/dL 6.2  4/14/25 08:21   Albumin 3.5 - 5.2 g/dL 4.0  4/14/25 08:21   Globulin 1.5 - 4.5 g/dL 3.0 (E)  1/25/22 09:23   A/G Ratio g/dL 1.8  4/14/25 08:21   AST (SGOT) 1 - 32 U/L 24  4/14/25 08:21   ALT (SGPT) 1 - 33 U/L 14  4/14/25 08:21   Total Bilirubin 0.0 - 1.2 mg/dL 0.4  4/14/25 08:21   eGFR Non African Am >60 mL/min/1.73 103  6/22/21 08:49   eGFR  Am >60  /1.73 m2 >60 (E)  1/25/22 09:23   TSH Baseline 0.270 - 4.200 uIU/mL 0.665  2/21/23 11:30   Free T4 0.93 - 1.70 ng/dL 1.02  3/24/22 06:55   Ferritin 13.00 - 150.00 ng/mL 22.10  2/21/23 11:30   Total Cholesterol 0 - 200 mg/dL 185  4/14/25 08:21   HDL Cholesterol 40 - 60 mg/dL 96 (H)  4/14/25 08:21   LDL Cholesterol  0 - 100 mg/dL 82  4/14/25 08:21   VLDL Cholesterol mg/dL 8.6  3/10/20 10:53   Triglycerides 0 - 150 mg/dL 29  4/14/25 08:21   Chol/HDL Ratio  1.93  4/14/25 08:21   LDL/HDL Ratio  0.98  4/12/24 07:54   VLDL Cholesterol Jean-Claude 5 - 40 mg/dL 7  4/14/25 08:21   25 Hydroxy, Vitamin D >30 ng/mL 32.5 (E)  1/25/22 09:23   Globulin gm/dL 2.2  4/14/25 08:21   Activated Clotting Time  82 - 152 Seconds 347 (H)  7/20/23 10:28   WBC 3.40 - 10.80 10*3/mm3 4.10  4/14/25 08:21   RBC 3.77 - 5.28 10*6/mm3 4.77  4/14/25 08:21   Hemoglobin 12.0 - 15.9 g/dL 14.1  4/14/25 08:21   Hematocrit 34.0 - 46.6 % 41.6  4/14/25 08:21   Platelets 140 - 450 10*3/mm3 232  4/14/25 08:21   RDW 12.3 - 15.4 % 12.6  4/14/25 08:21   MCV 79.0 - 97.0 fL 87.2  4/14/25 08:21   MCH 26.6 - 33.0 pg 29.6  4/14/25 08:21   MCHC 31.5 - 35.7 g/dL 33.9  4/14/25 08:21   MPV 6.0 - 12.0 fL 10.3  7/5/23 09:49   RDW-SD 37.0 - 54.0 fl 37.5  7/5/23 09:49   Neutrophil Rel % 42.7 - 76.0 % 65.6  4/14/25 08:21   Lymphocyte Rel % 19.6 - 45.3 % 22.2  4/14/25 08:21   Monocyte Rel % 5.0 - 12.0 % 8.8  4/14/25 08:21   Eosinophil Rel % 0.3 - 6.2 % 2.7  4/14/25 08:21   Basophil Rel % 0.0 - 1.5 % 0.7  4/14/25 08:21   Immature Granulocyte Rel % 0.0 - 0.5 % 0.0  4/14/25 08:21   Neutrophils Absolute 1.70 - 7.00 10*3/mm3 2.69  4/14/25 08:21   Lymphocytes Absolute 0.70 - 3.10 10*3/mm3 0.91  4/14/25 08:21   Monocytes Absolute 0.10 - 0.90 10*3/mm3 0.36  4/14/25 08:21   Eosinophils Absolute 0.00 - 0.40 10*3/mm3 0.11  4/14/25 08:21   Basophils Absolute 0.00 - 0.20 10*3/mm3 0.03  4/14/25 08:21   Immature Grans, Absolute 0.00 - 0.05 10*3/mm3 0.00  4/14/25 08:21   nRBC 0.0 - 0.2 /100 WBC  0.0  4/14/25 08:21   (H): Data is abnormally high  (E): External lab result  Past Medical History:   Diagnosis Date    Abnormal ECG 03/14/22    Allergic     Anxiety     Atrial fibrillation 03/14/22    Colon polyp 02/13    Benign    Coronary artery disease 04/22    A-Fib    Osteopenia     Osteoporosis        Past Surgical History:   Procedure Laterality Date    ABLATION OF DYSRHYTHMIC FOCUS  7/20/23    ADENOIDECTOMY  child    CARDIAC ELECTROPHYSIOLOGY PROCEDURE N/A 07/20/2023    Procedure: Ablation atrial fibrillation;  Surgeon: Juwan Agustin MD;  Location: [facility] CATH INVASIVE LOCATION;  Service: Cardiovascular;  Laterality: N/A;    CARDIAC SURGERY  7/23    Cardiac ablation    COLONOSCOPY      COLONOSCOPY N/A 12/28/2023    Procedure: COLONOSCOPY INTO TERMINAL ILEUM WITH POLYPECTOMY (COLD);  Surgeon: Jesus Manuel Hand MD;  Location: [facility] ENDOSCOPY;  Service: Gastroenterology;  Laterality: N/A;  PREOP/ SCREENING- POSTOP/ POLYP, DIVERTICULOSIS, HEMORRHOIDS    D & C AND LAPAROSCOPY      EYE SURGERY  2009    lens implants    TONSILLECTOMY         Social History     Socioeconomic History    Marital status:     Number of children: 2   Tobacco Use    Smoking status: Never    Smokeless tobacco: Never    Tobacco comments:     caffeine use   Vaping Use    Vaping status: Never Used   Substance and Sexual Activity    Alcohol use: Yes     Alcohol/week: 3.0 - 5.0 standard drinks of alcohol     Types: 3 - 5 Glasses of wine per week     Comment: 5-7/week    Drug use: No    Sexual activity: Not Currently     Partners: Male     Birth control/protection: None       Family History   Problem Relation Age of Onset    Hypertension Mother     Multiple myeloma Mother     Arthritis Mother     Cancer Mother     COPD Mother     Depression Mother     Hypertension Father     Prostate cancer Father     Arthritis Father     Cancer Father     COPD Father     Breast cancer Paternal Aunt     Heart disease Maternal Grandfather     Stroke  "Maternal Grandfather     Heart disease Maternal Uncle     Malig Hyperthermia Neg Hx        Review of Systems   Constitutional: Negative for diaphoresis and malaise/fatigue.   Cardiovascular:  Negative for chest pain, claudication, dyspnea on exertion, irregular heartbeat, leg swelling, near-syncope, orthopnea, palpitations, paroxysmal nocturnal dyspnea and syncope.   Respiratory:  Negative for cough, shortness of breath and sleep disturbances due to breathing.    Musculoskeletal:  Negative for falls.   Neurological:  Negative for dizziness and weakness.   Psychiatric/Behavioral:  Negative for altered mental status and substance abuse.        No Known Allergies      Current Outpatient Medications:     ASPIRIN 81 PO, Take 81 mg by mouth Daily., Disp: , Rfl:     B Complex-C-Folic Acid (Super B Complex/FA/Vit C) tablet, , Disp: , Rfl:     Bioflavonoid Products (Sera-C) 500-550 MG tablet, , Disp: , Rfl:     Calcium-Vitamin D-Vitamin K 500-500-40 MG-UNT-MCG chewable tablet, Chew., Disp: , Rfl:     glucosamine-chondroitin 500-400 MG capsule capsule, Take  by mouth 3 (three) times a day with meals., Disp: , Rfl:     Magnesium 100 MG capsule, , Disp: , Rfl:     metoprolol tartrate (LOPRESSOR) 25 MG tablet, Take 1 tablet by mouth As Needed (for a fib episode.  Hold for top BP # below 100 or HR below 60). (Patient taking differently: Take 1 tablet by mouth As Needed (for a fib episode.  Hold for top BP # below 100 or HR below 60). PRN ONLY), Disp: 90 tablet, Rfl: 1    Turmeric 500 MG tablet, Take  by mouth. 1,000, Disp: , Rfl:     Zinc 50 MG tablet, , Disp: , Rfl:         Objective:     Vitals:    06/03/25 0944   BP: 110/60   Pulse: 55   SpO2: 100%   Weight: 55.3 kg (122 lb)   Height: 165.1 cm (65\")     Body mass index is 20.3 kg/m².    PHYSICAL EXAM:    Constitutional:       General: Not in acute distress.     Appearance: Normal appearance. Well-developed.   Eyes:      Pupils: Pupils are equal, round, and reactive to light. "   HENT:      Head: Normocephalic.   Neck:      Vascular: No carotid bruit or JVD.   Pulmonary:      Effort: Pulmonary effort is normal. No tachypnea.      Breath sounds: Normal breath sounds. No wheezing. No rales.   Cardiovascular:      Normal rate. Regular rhythm.      No gallop.    Pulses:     Intact distal pulses.   Edema:     Peripheral edema absent.   Abdominal:      General: Bowel sounds are normal.      Palpations: Abdomen is soft.      Tenderness: There is no abdominal tenderness.   Musculoskeletal: Normal range of motion.      Cervical back: Normal range of motion and neck supple. No edema. Skin:     General: Skin is warm and dry.   Neurological:      Mental Status: Alert and oriented to person, place, and time.           ECG 12 Lead    Date/Time: 6/3/2025 11:07 AM  Performed by: Esther Sosa APRN    Authorized by: Esther Sosa APRN  Comparison: compared with previous ECG from 2/3/2025  Similar to previous ECG  Rhythm: sinus rhythm  Rate: normal  QRS axis: normal  Other findings: non-specific ST-T wave changes    Clinical impression: non-specific ECG        Lipid Panel          4/14/2025    08:21   Lipid Panel   Total Cholesterol 185    Triglycerides 29    HDL Cholesterol 96    VLDL Cholesterol 7    LDL Cholesterol  82          Assessment/Plan:      1.  Paroxysmal atrial fibrillation-using pill out of pocket with metoprolol 25 mg, continue aspirin.  [id number] of 2 but low A-fib burden primary cardiology and electrophysiology have agreed no anticoagulation at this time    2.  Mildly dilated ascending aorta of 3.8 cm on last echo    Follow-up in 1         Your medication list            Accurate as of Ivett 3, 2025 10:56 AM. If you have any questions, ask your nurse or doctor.                CHANGE how you take these medications        Instructions Last Dose Given Next Dose Due   metoprolol tartrate 25 MG tablet  Commonly known as: LOPRESSOR  What changed: additional instructions      Take 1  tablet by mouth As Needed (for a fib episode.  Hold for top BP # below 100 or HR below 60).              CONTINUE taking these medications        Instructions Last Dose Given Next Dose Due   ASPIRIN 81 PO      Take 81 mg by mouth Daily.       Calcium-Vitamin D-Vitamin K 500-500-40 MG-UNT-MCG chewable tablet      Chew.       Sera-C 500-550 MG tablet           glucosamine-chondroitin 500-400 MG capsule capsule      Take  by mouth 3 (three) times a day with meals.       Magnesium 100 MG capsule           Super B Complex/FA/Vit C tablet           Turmeric 500 MG tablet      Take  by mouth. 1,000       Zinc 50 MG tablet                  STOP taking these medications      B-12 100-5000 MCG sublingual tablet  Stopped by: Esther Sosa                   As always, it has been a pleasure to participate in your patient's care.      Sincerely,     Esther AZEVEDO

## (undated) DEVICE — ADAPT CLN BIOGUARD AIR/H2O DISP

## (undated) DEVICE — Device: Brand: ISMUS

## (undated) DEVICE — Device

## (undated) DEVICE — LOU EP: Brand: MEDLINE INDUSTRIES, INC.

## (undated) DEVICE — Device: Brand: THERMOCOOL SMARTTOUCH SF

## (undated) DEVICE — Device: Brand: REFERENCE PATCH CARTO 3

## (undated) DEVICE — SENSR O2 OXIMAX FNGR A/ 18IN NONSTR

## (undated) DEVICE — PINNACLE INTRODUCER SHEATH: Brand: PINNACLE

## (undated) DEVICE — Device: Brand: WEBSTER CS

## (undated) DEVICE — PREF.GUIDING SHEATH W/MULT.CRV: Brand: PREFACE

## (undated) DEVICE — OCTA,PERSEID,2-2-2-2-2,F-CURVE: Brand: OCTARAY MAPPING CATHETER

## (undated) DEVICE — LN SMPL CO2 SHTRM SD STREAM W/M LUER

## (undated) DEVICE — Device: Brand: THERMOCOOL SF NAV

## (undated) DEVICE — BLANKT WARM UNDER/BDY FUL/ACC A/ 90X206CM

## (undated) DEVICE — TUBING, SUCTION, 1/4" X 10', STRAIGHT: Brand: MEDLINE

## (undated) DEVICE — SYS TRNSEP ACC ACROSS ADLT BRK1 71CM

## (undated) DEVICE — CANN O2 ETCO2 FITS ALL CONN CO2 SMPL A/ 7IN DISP LF

## (undated) DEVICE — Device: Brand: SMARTABLATE

## (undated) DEVICE — KT ORCA ORCAPOD DISP STRL

## (undated) DEVICE — Device: Brand: SOUNDSTAR